# Patient Record
Sex: FEMALE | Race: OTHER | URBAN - METROPOLITAN AREA
[De-identification: names, ages, dates, MRNs, and addresses within clinical notes are randomized per-mention and may not be internally consistent; named-entity substitution may affect disease eponyms.]

---

## 2019-12-06 ENCOUNTER — INPATIENT (INPATIENT)
Facility: HOSPITAL | Age: 54
LOS: 3 days | Discharge: PSYCHIATRIC OR SUBSTANCE USE FACILITY | DRG: 917 | End: 2019-12-10
Attending: INTERNAL MEDICINE | Admitting: INTERNAL MEDICINE
Payer: COMMERCIAL

## 2019-12-06 VITALS
DIASTOLIC BLOOD PRESSURE: 81 MMHG | SYSTOLIC BLOOD PRESSURE: 144 MMHG | TEMPERATURE: 98 F | WEIGHT: 190.04 LBS | OXYGEN SATURATION: 97 % | RESPIRATION RATE: 16 BRPM | HEART RATE: 76 BPM | HEIGHT: 65 IN

## 2019-12-06 LAB
ALBUMIN SERPL ELPH-MCNC: 3.6 G/DL — SIGNIFICANT CHANGE UP (ref 3.4–5)
ALBUMIN SERPL ELPH-MCNC: 4.3 G/DL — SIGNIFICANT CHANGE UP (ref 3.4–5)
ALP SERPL-CCNC: 155 U/L — HIGH (ref 40–120)
ALP SERPL-CCNC: 175 U/L — HIGH (ref 40–120)
ALT FLD-CCNC: 21 U/L — SIGNIFICANT CHANGE UP (ref 12–42)
ALT FLD-CCNC: 24 U/L — SIGNIFICANT CHANGE UP (ref 12–42)
ANION GAP SERPL CALC-SCNC: 12 MMOL/L — SIGNIFICANT CHANGE UP (ref 9–16)
ANION GAP SERPL CALC-SCNC: 7 MMOL/L — LOW (ref 9–16)
ANION GAP SERPL CALC-SCNC: 9 MMOL/L — SIGNIFICANT CHANGE UP (ref 9–16)
APPEARANCE UR: CLEAR — SIGNIFICANT CHANGE UP
AST SERPL-CCNC: 26 U/L — SIGNIFICANT CHANGE UP (ref 15–37)
AST SERPL-CCNC: 43 U/L — HIGH (ref 15–37)
BASOPHILS # BLD AUTO: 0.06 K/UL — SIGNIFICANT CHANGE UP (ref 0–0.2)
BASOPHILS NFR BLD AUTO: 0.4 % — SIGNIFICANT CHANGE UP (ref 0–2)
BILIRUB SERPL-MCNC: 0.1 MG/DL — LOW (ref 0.2–1.2)
BILIRUB SERPL-MCNC: 0.4 MG/DL — SIGNIFICANT CHANGE UP (ref 0.2–1.2)
BILIRUB UR-MCNC: NEGATIVE — SIGNIFICANT CHANGE UP
BUN SERPL-MCNC: 11 MG/DL — SIGNIFICANT CHANGE UP (ref 7–23)
BUN SERPL-MCNC: 6 MG/DL — LOW (ref 7–23)
BUN SERPL-MCNC: 8 MG/DL — SIGNIFICANT CHANGE UP (ref 7–23)
CALCIUM SERPL-MCNC: 10.1 MG/DL — SIGNIFICANT CHANGE UP (ref 8.5–10.5)
CALCIUM SERPL-MCNC: 8.6 MG/DL — SIGNIFICANT CHANGE UP (ref 8.5–10.5)
CALCIUM SERPL-MCNC: 9 MG/DL — SIGNIFICANT CHANGE UP (ref 8.5–10.5)
CHLORIDE SERPL-SCNC: 100 MMOL/L — SIGNIFICANT CHANGE UP (ref 96–108)
CHLORIDE SERPL-SCNC: 106 MMOL/L — SIGNIFICANT CHANGE UP (ref 96–108)
CHLORIDE SERPL-SCNC: 89 MMOL/L — LOW (ref 96–108)
CO2 SERPL-SCNC: 21 MMOL/L — LOW (ref 22–31)
CO2 SERPL-SCNC: 22 MMOL/L — SIGNIFICANT CHANGE UP (ref 22–31)
CO2 SERPL-SCNC: 23 MMOL/L — SIGNIFICANT CHANGE UP (ref 22–31)
COLOR SPEC: YELLOW — SIGNIFICANT CHANGE UP
CREAT SERPL-MCNC: 0.91 MG/DL — SIGNIFICANT CHANGE UP (ref 0.5–1.3)
CREAT SERPL-MCNC: 0.99 MG/DL — SIGNIFICANT CHANGE UP (ref 0.5–1.3)
CREAT SERPL-MCNC: 1.33 MG/DL — HIGH (ref 0.5–1.3)
DIFF PNL FLD: ABNORMAL
EOSINOPHIL # BLD AUTO: 0.22 K/UL — SIGNIFICANT CHANGE UP (ref 0–0.5)
EOSINOPHIL NFR BLD AUTO: 1.6 % — SIGNIFICANT CHANGE UP (ref 0–6)
FLU A RESULT: SIGNIFICANT CHANGE UP
FLU A RESULT: SIGNIFICANT CHANGE UP
FLUAV AG NPH QL: SIGNIFICANT CHANGE UP
FLUBV AG NPH QL: SIGNIFICANT CHANGE UP
GLUCOSE SERPL-MCNC: 147 MG/DL — HIGH (ref 70–99)
GLUCOSE SERPL-MCNC: 276 MG/DL — HIGH (ref 70–99)
GLUCOSE SERPL-MCNC: 289 MG/DL — HIGH (ref 70–99)
GLUCOSE UR QL: 250
HCG UR QL: NEGATIVE — SIGNIFICANT CHANGE UP
HCT VFR BLD CALC: 39.9 % — SIGNIFICANT CHANGE UP (ref 34.5–45)
HGB BLD-MCNC: 13.1 G/DL — SIGNIFICANT CHANGE UP (ref 11.5–15.5)
IMM GRANULOCYTES NFR BLD AUTO: 0.7 % — SIGNIFICANT CHANGE UP (ref 0–1.5)
KETONES UR-MCNC: ABNORMAL MG/DL
LEUKOCYTE ESTERASE UR-ACNC: ABNORMAL
LITHIUM SERPL-MCNC: 3.27 MMOL/L — CRITICAL HIGH (ref 0.6–1.2)
LYMPHOCYTES # BLD AUTO: 1.75 K/UL — SIGNIFICANT CHANGE UP (ref 1–3.3)
LYMPHOCYTES # BLD AUTO: 12.8 % — LOW (ref 13–44)
MAGNESIUM SERPL-MCNC: 1.5 MG/DL — LOW (ref 1.6–2.6)
MAGNESIUM SERPL-MCNC: 1.9 MG/DL — SIGNIFICANT CHANGE UP (ref 1.6–2.6)
MCHC RBC-ENTMCNC: 28.4 PG — SIGNIFICANT CHANGE UP (ref 27–34)
MCHC RBC-ENTMCNC: 32.8 GM/DL — SIGNIFICANT CHANGE UP (ref 32–36)
MCV RBC AUTO: 86.6 FL — SIGNIFICANT CHANGE UP (ref 80–100)
MONOCYTES # BLD AUTO: 0.67 K/UL — SIGNIFICANT CHANGE UP (ref 0–0.9)
MONOCYTES NFR BLD AUTO: 4.9 % — SIGNIFICANT CHANGE UP (ref 2–14)
NEUTROPHILS # BLD AUTO: 10.89 K/UL — HIGH (ref 1.8–7.4)
NEUTROPHILS NFR BLD AUTO: 79.6 % — HIGH (ref 43–77)
NITRITE UR-MCNC: NEGATIVE — SIGNIFICANT CHANGE UP
NRBC # BLD: 0 /100 WBCS — SIGNIFICANT CHANGE UP (ref 0–0)
PCO2 BLDA: 51 MMHG — HIGH (ref 32–45)
PH BLDA: 7.26 — LOW (ref 7.35–7.45)
PH UR: 6.5 — SIGNIFICANT CHANGE UP (ref 5–8)
PLATELET # BLD AUTO: 382 K/UL — SIGNIFICANT CHANGE UP (ref 150–400)
PO2 BLDA: 28 MMHG — CRITICAL LOW (ref 83–108)
POTASSIUM SERPL-MCNC: 3.2 MMOL/L — LOW (ref 3.5–5.3)
POTASSIUM SERPL-MCNC: 4 MMOL/L — SIGNIFICANT CHANGE UP (ref 3.5–5.3)
POTASSIUM SERPL-MCNC: 4.1 MMOL/L — SIGNIFICANT CHANGE UP (ref 3.5–5.3)
POTASSIUM SERPL-SCNC: 3.2 MMOL/L — LOW (ref 3.5–5.3)
POTASSIUM SERPL-SCNC: 4 MMOL/L — SIGNIFICANT CHANGE UP (ref 3.5–5.3)
POTASSIUM SERPL-SCNC: 4.1 MMOL/L — SIGNIFICANT CHANGE UP (ref 3.5–5.3)
PROT SERPL-MCNC: 6.9 G/DL — SIGNIFICANT CHANGE UP (ref 6.4–8.2)
PROT SERPL-MCNC: 8.3 G/DL — HIGH (ref 6.4–8.2)
PROT UR-MCNC: 30 MG/DL
RBC # BLD: 4.61 M/UL — SIGNIFICANT CHANGE UP (ref 3.8–5.2)
RBC # FLD: 13.2 % — SIGNIFICANT CHANGE UP (ref 10.3–14.5)
RSV RESULT: SIGNIFICANT CHANGE UP
RSV RNA RESP QL NAA+PROBE: SIGNIFICANT CHANGE UP
SAO2 % BLDA: 40 % — LOW (ref 95–100)
SODIUM SERPL-SCNC: 124 MMOL/L — LOW (ref 132–145)
SODIUM SERPL-SCNC: 131 MMOL/L — LOW (ref 132–145)
SODIUM SERPL-SCNC: 134 MMOL/L — SIGNIFICANT CHANGE UP (ref 132–145)
SP GR SPEC: <=1.005 — SIGNIFICANT CHANGE UP (ref 1–1.03)
UROBILINOGEN FLD QL: 0.2 E.U./DL — SIGNIFICANT CHANGE UP
WBC # BLD: 13.68 K/UL — HIGH (ref 3.8–10.5)
WBC # FLD AUTO: 13.68 K/UL — HIGH (ref 3.8–10.5)

## 2019-12-06 PROCEDURE — 99291 CRITICAL CARE FIRST HOUR: CPT

## 2019-12-06 PROCEDURE — 99292 CRITICAL CARE ADDL 30 MIN: CPT

## 2019-12-06 PROCEDURE — 71045 X-RAY EXAM CHEST 1 VIEW: CPT | Mod: 26

## 2019-12-06 PROCEDURE — 70450 CT HEAD/BRAIN W/O DYE: CPT | Mod: 26

## 2019-12-06 RX ORDER — SODIUM CHLORIDE 9 MG/ML
2000 INJECTION INTRAMUSCULAR; INTRAVENOUS; SUBCUTANEOUS ONCE
Refills: 0 | Status: COMPLETED | OUTPATIENT
Start: 2019-12-06 | End: 2019-12-06

## 2019-12-06 RX ORDER — MIDAZOLAM HYDROCHLORIDE 1 MG/ML
2 INJECTION, SOLUTION INTRAMUSCULAR; INTRAVENOUS ONCE
Refills: 0 | Status: DISCONTINUED | OUTPATIENT
Start: 2019-12-06 | End: 2019-12-06

## 2019-12-06 RX ORDER — ARIPIPRAZOLE 15 MG/1
30 TABLET ORAL ONCE
Refills: 0 | Status: COMPLETED | OUTPATIENT
Start: 2019-12-06 | End: 2019-12-06

## 2019-12-06 RX ORDER — PHENAZOPYRIDINE HCL 100 MG
200 TABLET ORAL ONCE
Refills: 0 | Status: COMPLETED | OUTPATIENT
Start: 2019-12-06 | End: 2019-12-06

## 2019-12-06 RX ORDER — HALOPERIDOL DECANOATE 100 MG/ML
5 INJECTION INTRAMUSCULAR ONCE
Refills: 0 | Status: COMPLETED | OUTPATIENT
Start: 2019-12-06 | End: 2019-12-06

## 2019-12-06 RX ORDER — KETAMINE HYDROCHLORIDE 100 MG/ML
10 INJECTION INTRAMUSCULAR; INTRAVENOUS ONCE
Refills: 0 | Status: DISCONTINUED | OUTPATIENT
Start: 2019-12-06 | End: 2019-12-06

## 2019-12-06 RX ORDER — DIPHENHYDRAMINE HCL 50 MG
50 CAPSULE ORAL ONCE
Refills: 0 | Status: COMPLETED | OUTPATIENT
Start: 2019-12-06 | End: 2019-12-06

## 2019-12-06 RX ORDER — SODIUM CHLORIDE 9 MG/ML
1000 INJECTION INTRAMUSCULAR; INTRAVENOUS; SUBCUTANEOUS ONCE
Refills: 0 | Status: COMPLETED | OUTPATIENT
Start: 2019-12-06 | End: 2019-12-06

## 2019-12-06 RX ORDER — CEFTRIAXONE 500 MG/1
1000 INJECTION, POWDER, FOR SOLUTION INTRAMUSCULAR; INTRAVENOUS ONCE
Refills: 0 | Status: COMPLETED | OUTPATIENT
Start: 2019-12-06 | End: 2019-12-06

## 2019-12-06 RX ORDER — MAGNESIUM SULFATE 500 MG/ML
2 VIAL (ML) INJECTION ONCE
Refills: 0 | Status: COMPLETED | OUTPATIENT
Start: 2019-12-06 | End: 2019-12-06

## 2019-12-06 RX ORDER — SODIUM CHLORIDE 9 MG/ML
1000 INJECTION INTRAMUSCULAR; INTRAVENOUS; SUBCUTANEOUS
Refills: 0 | Status: DISCONTINUED | OUTPATIENT
Start: 2019-12-07 | End: 2019-12-07

## 2019-12-06 RX ORDER — POTASSIUM CHLORIDE 20 MEQ
40 PACKET (EA) ORAL ONCE
Refills: 0 | Status: COMPLETED | OUTPATIENT
Start: 2019-12-06 | End: 2019-12-06

## 2019-12-06 RX ORDER — METFORMIN HYDROCHLORIDE 850 MG/1
1000 TABLET ORAL ONCE
Refills: 0 | Status: COMPLETED | OUTPATIENT
Start: 2019-12-06 | End: 2019-12-06

## 2019-12-06 RX ORDER — ACETAMINOPHEN 500 MG
975 TABLET ORAL ONCE
Refills: 0 | Status: COMPLETED | OUTPATIENT
Start: 2019-12-06 | End: 2019-12-06

## 2019-12-06 RX ORDER — AZITHROMYCIN 500 MG/1
500 TABLET, FILM COATED ORAL ONCE
Refills: 0 | Status: COMPLETED | OUTPATIENT
Start: 2019-12-06 | End: 2019-12-06

## 2019-12-06 RX ADMIN — ARIPIPRAZOLE 30 MILLIGRAM(S): 15 TABLET ORAL at 16:10

## 2019-12-06 RX ADMIN — Medication 50 MILLIGRAM(S): at 12:24

## 2019-12-06 RX ADMIN — METFORMIN HYDROCHLORIDE 1000 MILLIGRAM(S): 850 TABLET ORAL at 16:13

## 2019-12-06 RX ADMIN — KETAMINE HYDROCHLORIDE 10 MILLIGRAM(S): 100 INJECTION INTRAMUSCULAR; INTRAVENOUS at 14:46

## 2019-12-06 RX ADMIN — AZITHROMYCIN 255 MILLIGRAM(S): 500 TABLET, FILM COATED ORAL at 16:17

## 2019-12-06 RX ADMIN — KETAMINE HYDROCHLORIDE 10 MILLIGRAM(S): 100 INJECTION INTRAMUSCULAR; INTRAVENOUS at 20:53

## 2019-12-06 RX ADMIN — Medication 50 GRAM(S): at 11:07

## 2019-12-06 RX ADMIN — Medication 50 MILLIGRAM(S): at 17:29

## 2019-12-06 RX ADMIN — Medication 1 MILLIGRAM(S): at 10:27

## 2019-12-06 RX ADMIN — Medication 1 MILLIGRAM(S): at 11:48

## 2019-12-06 RX ADMIN — Medication 40 MILLIEQUIVALENT(S): at 11:07

## 2019-12-06 RX ADMIN — KETAMINE HYDROCHLORIDE 10 MILLIGRAM(S): 100 INJECTION INTRAMUSCULAR; INTRAVENOUS at 17:38

## 2019-12-06 RX ADMIN — KETAMINE HYDROCHLORIDE 10 MILLIGRAM(S): 100 INJECTION INTRAMUSCULAR; INTRAVENOUS at 13:23

## 2019-12-06 RX ADMIN — SODIUM CHLORIDE 1000 MILLILITER(S): 9 INJECTION INTRAMUSCULAR; INTRAVENOUS; SUBCUTANEOUS at 10:27

## 2019-12-06 RX ADMIN — Medication 2 MILLIGRAM(S): at 14:46

## 2019-12-06 RX ADMIN — SODIUM CHLORIDE 2000 MILLILITER(S): 9 INJECTION INTRAMUSCULAR; INTRAVENOUS; SUBCUTANEOUS at 18:38

## 2019-12-06 RX ADMIN — Medication 975 MILLIGRAM(S): at 11:07

## 2019-12-06 RX ADMIN — Medication 200 MILLIGRAM(S): at 12:23

## 2019-12-06 RX ADMIN — KETAMINE HYDROCHLORIDE 10 MILLIGRAM(S): 100 INJECTION INTRAMUSCULAR; INTRAVENOUS at 14:11

## 2019-12-06 RX ADMIN — HALOPERIDOL DECANOATE 5 MILLIGRAM(S): 100 INJECTION INTRAMUSCULAR at 15:54

## 2019-12-06 RX ADMIN — SODIUM CHLORIDE 1000 MILLILITER(S): 9 INJECTION INTRAMUSCULAR; INTRAVENOUS; SUBCUTANEOUS at 11:09

## 2019-12-06 RX ADMIN — CEFTRIAXONE 100 MILLIGRAM(S): 500 INJECTION, POWDER, FOR SOLUTION INTRAMUSCULAR; INTRAVENOUS at 11:07

## 2019-12-06 RX ADMIN — Medication 2 MILLIGRAM(S): at 12:23

## 2019-12-06 RX ADMIN — KETAMINE HYDROCHLORIDE 10 MILLIGRAM(S): 100 INJECTION INTRAMUSCULAR; INTRAVENOUS at 22:46

## 2019-12-06 RX ADMIN — HALOPERIDOL DECANOATE 5 MILLIGRAM(S): 100 INJECTION INTRAMUSCULAR at 13:16

## 2019-12-06 NOTE — ED PROVIDER NOTE - CLINICAL SUMMARY MEDICAL DECISION MAKING FREE TEXT BOX
53 y/o F with hx of bipolar disorder, asthma, type II diabetes on insulin and metformin, HLD, and skin rash sent in from mental [facility] for bizarre shaking behavior. Pt very difficult to interview secondary to intermittent agitation and very low intellectual functioning. Patient does not indicate specific pain but on exam patient is agitated and difficult to redirect. Patient not cooperating, trying to leave ED AMA.... 53 y/o F with hx of bipolar disorder, asthma, type II diabetes on insulin and metformin, HLD, and skin rash sent in from mental health shelter for bizarre shaking behavior. Pt very difficult to interview secondary to intermittent agitation and very low intellectual functioning and AMS/apparent delirium. Patient does not indicate specific pain but on exam patient is agitated and difficult to redirect. Patient not cooperating, trying to elope. Initial eval suggestive of UTI, possible PNA. Getting IV sedation- very difficult to sedate. Placed on obs. A. Will give IV sedation.

## 2019-12-06 NOTE — ED PROVIDER NOTE - OBJECTIVE STATEMENT
53 y/o F with hx of bipolar disorder type II diabetes on insulin and metformin, HLD, and skin rash sent in from mental health shelter for bizarre shaking behavior. Pt very difficult to interview secondary to intermittent agitation and very low intellectual functioning. Patient endorses almost everything positive on ROS (+ sore throat, +urinary discomfort) and says that she is uncontrollably shaking and does not know why. At times says she is anxious. Limited history secondary to severity of mental illness. 53 y/o F with hx of bipolar disorder type II, asthma, diabetes on insulin and metformin, HLD, and skin rash sent in from mental health shelter for bizarre shaking behavior. Pt very difficult to interview secondary to intermittent agitation and very low intellectual functioning. Patient endorses almost everything positive on ROS (+ sore throat, +urinary discomfort) and says that she is uncontrollably shaking and does not know why. At times says she is anxious. Limited history secondary to severity of mental illness. 53 y/o F with hx of bipolar disorder type II, asthma, diabetes on insulin and metformin, HLD, and skin rash sent in from mental health shelter for bizarre shaking behavior. Pt very difficult to interview secondary to intermittent agitation and apparent very low intellectual functioning and AMS. Patient endorses almost everything positive on ROS (+ sore throat, +urinary discomfort) and says that she is uncontrollably shaking and does not know why. At times says she is anxious. Limited history secondary to severity of mental illness and AMS.

## 2019-12-06 NOTE — ED CDU PROVIDER INITIAL DAY NOTE - DETAILS
Patient placed on obs fo AMS, delirium and UTI/poss PNA. Will need admission. Very difficult to sedate.

## 2019-12-06 NOTE — ED ADULT NURSE NOTE - CHIEF COMPLAINT QUOTE
pt ran from [facility] [address] ; called 911 to say, "I'm having a seizure" Pt is ambulate with steady gait, and states she is presently having a seizure in triage. Able to answer questions, anxious. Hx Dm, asthma

## 2019-12-06 NOTE — ED PROVIDER NOTE - PMH
Bipolar 1 disorder    HLD (hyperlipidemia)    Skin rash    Type II diabetes mellitus Asthma    Bipolar 1 disorder    HLD (hyperlipidemia)    Mental health disorder    Skin rash    Type II diabetes mellitus

## 2019-12-06 NOTE — ED PROVIDER NOTE - PROGRESS NOTE DETAILS
Agitated and attempting to leave ED. Not cooperating. Need to treat UTI with IV antibiotics and give fluids to stabilize electrolyte abnormalities. Agitated and attempting to leave ED. Not cooperating. Need to treat UTI with IV antibiotics and give fluids to stabilize electrolyte abnormalities. Will restrain patient and put her on a 1 to 1. Will give Haldol. Giving IV ketamine due to no change in agitation. In total received 5 Haldol IM, 4 IV ativan, 50 IV Benadryl. unclear whether this is close to baseline psych or whether hyponatremia and UTI is worsening her mental status. Patient still not cooperating. Remains awake and agitated s/p Haldol. Will give 2mg ativan IV. Additional 5mg of Haldol. Head CT negative pending admission to [facility]. Trying to titrate medications so she will be safe for transport.

## 2019-12-06 NOTE — ED CDU PROVIDER INITIAL DAY NOTE - OBJECTIVE STATEMENT
55 y/o F with hx of bipolar disorder type II, asthma, diabetes on insulin and metformin, HLD, and skin rash sent in from mental health shelter for bizarre shaking behavior. Pt very difficult to interview secondary to intermittent agitation and apparent very low intellectual functioning and AMS. Patient endorses almost everything positive on ROS (+ sore throat, +urinary discomfort) and says that she is uncontrollably shaking and does not know why. At times says she is anxious. Limited history secondary to severity of mental illness and AMS.

## 2019-12-06 NOTE — ED ADULT NURSE NOTE - OBJECTIVE STATEMENT
Pt present to the ER voluntarily shaking. States that she is having a seizure while shaking. Unreliable historian. Says yes to every question asked.

## 2019-12-06 NOTE — ED ADULT NURSE REASSESSMENT NOTE - NS ED NURSE REASSESS COMMENT FT1
Pt bed assignment changed. Report given to CARA Donovan. Awaiting transportation. Pt on continuos one to one.
Pt received from previous RN. Pt in continuos restraints no signs of skin break down. Pt on continuos one to one. Repeat blood work obtained. Pending transportation to [facility].
Pt still in 4 point restraints. Pt is almost asleep but easily arousable. Kept in restraints for own safety. Awaiting ICU bed. [place] level back as above normal.
Pt transferred to [facility]. Pt in 4 point restraints as per MD order. Pt has two IV lines. Pt awake and alert. Vitals as per flow sheet.
Pt trying to elope. Trying to be redirected to room. Pt is confused and is not capable of leaving the ER safely alone. Put in 4 point restraints and medicated as per order. Put on 1;1. Belongings taken, bagged and labelled. Security at bedside. To monitor pt while in restraints.
ketamine premixed by pharmacy unable to be scan
pt still screaming and agitated, confused. Requesting for her daily meds- given as ordered. Pt on continuous 1;1 observation. Fall/elopement prec initiated and maintained.
assumed care of pt, pt is in CT at this time and tech is calling to request additional medication for sedation. Pt is sitting up in bed and not able to relax for ct. Pt has previously been in leather restraints because she is confused and combative to staff and endangering herself.

## 2019-12-06 NOTE — ED PROVIDER NOTE - EYES, MLM
Clear bilaterally, pupils equal, round and reactive to light. Clear bilaterally, pupils equal, round 4mm, no nystagmus

## 2019-12-06 NOTE — ED CDU PROVIDER INITIAL DAY NOTE - MEDICAL DECISION MAKING DETAILS
Patient placed on obs fo AMS, delirium and UTI/poss PNA. Was very difficult to sedate. In total got Ativan 6mg IV, Benadryl 100mg IV, Haldol 10 mg IM, Aripiprazole 30mg PO and ketamine 10mg mg x 5 doses (holds her for about 30 mins). Li level 3.27. Got CTX and Azithro. Accepted for SDU admit at [facility] by Dr. Mao.

## 2019-12-06 NOTE — ED CDU PROVIDER INITIAL DAY NOTE - PMH
Asthma    Bipolar 1 disorder    HLD (hyperlipidemia)    Mental health disorder    Skin rash    Type II diabetes mellitus

## 2019-12-06 NOTE — ED ADULT TRIAGE NOTE - CHIEF COMPLAINT QUOTE
pt ran from [facility] [address] ; called 911 to say, "I'm having a seizure" Pt is ambulate with steady gait, and states she is presently having a seizure in triage. Able to answer questions, anxious. Hx Dm, asthma

## 2019-12-06 NOTE — ED CDU PROVIDER INITIAL DAY NOTE - PROGRESS NOTE DETAILS
Agitated and attempting to leave ED. Not cooperating. Need to treat UTI with IV antibiotics and give fluids to stabilize electrolyte abnormalities. Will restrain patient and put her on a 1 to 1. Will give Haldol. Giving IV ketamine due to no change in agitation. In total received 5 Haldol IM, 4 IV ativan, 50 IV Benadryl. unclear whether this is close to baseline psych or whether hyponatremia and UTI is worsening her mental status. Patient still not cooperating. Remains awake and agitated s/p Haldol. Will give 2mg ativan IV. Additional 5mg of Haldol. Head CT negative pending admission to [facility]. Trying to titrate medications so she will be safe for transport. Finally sleeping x 1h. Accepted to [facility] SDU.  Li toxicity mixed with UTI and poss PNA.  I left a message for RN at the [facility] where she is a new resident. [phone] Spoke with ANKITA Tox, they agree with supportive care and diuresis. No HD. Trend levels, call [facility] to ask about med changes tomorrow. Tox will follow along. Spoke with ANKITA Tox, Dr. Gonzalez. They agree with supportive care and diuresis. No HD. Trend levels, call [facility] to ask about med changes tomorrow. Tox will follow along. Dr. Moore upgraded patient to MICU as a bed opened up. ABG attempted- patient struggling, needed manual hold. Was likely a VGB. 7.26/51. No AG and normal HCO3 on last set of labs. Repeat BMP pending. Tox svc called back, they want a repeat Li level when she gets to [facility], if trending down cont IVF, if not dropping consider HD. Patient still not able to comprehend dx but is more awake. No longer yelling constantly.

## 2019-12-06 NOTE — ED PROVIDER NOTE - DIAGNOSTIC INTERPRETATION
Interpreted by ED physician  Chest x-ray, 1 views  Lungs clear, heart shadow normal, bony structures normal, no free air under diaphragm, no PTX Interpreted by ED physician  Chest x-ray, 1 view  Lungs clear, heart shadow normal, bony structures normal, no free air under diaphragm, no PTX

## 2019-12-06 NOTE — ED PROVIDER NOTE - MUSCULOSKELETAL, MLM
Spine appears normal, range of motion is not limited, no muscle or joint tenderness. Walked on arrival then was unable to walk unassisted.

## 2019-12-06 NOTE — ED PROVIDER NOTE - SKIN, MLM
Scaling erythematous circumscribed lesions over abdomen and back. ? psoriasis or eczema, chronic appearing.

## 2019-12-06 NOTE — ED CDU PROVIDER DISPOSITION NOTE - CLINICAL COURSE
Patient placed on obs fo AMS, delirium and UTI/poss PNA. Was very difficult to sedate. In total got Ativan 6mg IV, Benadryl 100mg IV, Haldol 10 mg IM, Aripiprazole 30mg PO and ketamine 10mg mg x 5 doses (holds her for about 30 mins). Li level 3.27. Got CTX and Azithro. Accepted for SDU admit at [facility] by Dr. Mao.    Na coming up with IVF, getting more saline for Li level that just came back.   [facility] ([facility] of [facility]) is where she stays. [phone]

## 2019-12-07 LAB
ALBUMIN SERPL ELPH-MCNC: 3.5 G/DL — SIGNIFICANT CHANGE UP (ref 3.3–5)
ALBUMIN SERPL ELPH-MCNC: 3.5 G/DL — SIGNIFICANT CHANGE UP (ref 3.3–5)
ALP SERPL-CCNC: 135 U/L — HIGH (ref 40–120)
ALP SERPL-CCNC: 151 U/L — HIGH (ref 40–120)
ALT FLD-CCNC: 15 U/L — SIGNIFICANT CHANGE UP (ref 10–45)
ALT FLD-CCNC: 16 U/L — SIGNIFICANT CHANGE UP (ref 10–45)
ANION GAP SERPL CALC-SCNC: 10 MMOL/L — SIGNIFICANT CHANGE UP (ref 5–17)
ANION GAP SERPL CALC-SCNC: 7 MMOL/L — SIGNIFICANT CHANGE UP (ref 5–17)
ANION GAP SERPL CALC-SCNC: 7 MMOL/L — SIGNIFICANT CHANGE UP (ref 5–17)
APTT BLD: 27.4 SEC — LOW (ref 27.5–36.3)
AST SERPL-CCNC: 29 U/L — SIGNIFICANT CHANGE UP (ref 10–40)
AST SERPL-CCNC: 30 U/L — SIGNIFICANT CHANGE UP (ref 10–40)
BASOPHILS # BLD AUTO: 0.04 K/UL — SIGNIFICANT CHANGE UP (ref 0–0.2)
BASOPHILS NFR BLD AUTO: 0.4 % — SIGNIFICANT CHANGE UP (ref 0–2)
BILIRUB SERPL-MCNC: 0.2 MG/DL — SIGNIFICANT CHANGE UP (ref 0.2–1.2)
BILIRUB SERPL-MCNC: 0.4 MG/DL — SIGNIFICANT CHANGE UP (ref 0.2–1.2)
BUN SERPL-MCNC: 3 MG/DL — LOW (ref 7–23)
BUN SERPL-MCNC: 4 MG/DL — LOW (ref 7–23)
BUN SERPL-MCNC: 5 MG/DL — LOW (ref 7–23)
CALCIUM SERPL-MCNC: 8.5 MG/DL — SIGNIFICANT CHANGE UP (ref 8.4–10.5)
CALCIUM SERPL-MCNC: 8.6 MG/DL — SIGNIFICANT CHANGE UP (ref 8.4–10.5)
CALCIUM SERPL-MCNC: 9 MG/DL — SIGNIFICANT CHANGE UP (ref 8.4–10.5)
CHLORIDE SERPL-SCNC: 110 MMOL/L — HIGH (ref 96–108)
CHLORIDE SERPL-SCNC: 111 MMOL/L — HIGH (ref 96–108)
CHLORIDE SERPL-SCNC: 113 MMOL/L — HIGH (ref 96–108)
CHLORIDE UR-SCNC: 55 MMOL/L — SIGNIFICANT CHANGE UP
CO2 SERPL-SCNC: 19 MMOL/L — LOW (ref 22–31)
CO2 SERPL-SCNC: 20 MMOL/L — LOW (ref 22–31)
CO2 SERPL-SCNC: 20 MMOL/L — LOW (ref 22–31)
CREAT ?TM UR-MCNC: 18 MG/DL — SIGNIFICANT CHANGE UP
CREAT SERPL-MCNC: 0.65 MG/DL — SIGNIFICANT CHANGE UP (ref 0.5–1.3)
CREAT SERPL-MCNC: 0.66 MG/DL — SIGNIFICANT CHANGE UP (ref 0.5–1.3)
CREAT SERPL-MCNC: 0.68 MG/DL — SIGNIFICANT CHANGE UP (ref 0.5–1.3)
CULTURE RESULTS: SIGNIFICANT CHANGE UP
EOSINOPHIL # BLD AUTO: 0.17 K/UL — SIGNIFICANT CHANGE UP (ref 0–0.5)
EOSINOPHIL NFR BLD AUTO: 1.6 % — SIGNIFICANT CHANGE UP (ref 0–6)
ETHANOL SERPL-MCNC: <10 MG/DL — SIGNIFICANT CHANGE UP (ref 0–10)
GLUCOSE BLDC GLUCOMTR-MCNC: 151 MG/DL — HIGH (ref 70–99)
GLUCOSE BLDC GLUCOMTR-MCNC: 190 MG/DL — HIGH (ref 70–99)
GLUCOSE BLDC GLUCOMTR-MCNC: 258 MG/DL — HIGH (ref 70–99)
GLUCOSE BLDC GLUCOMTR-MCNC: 337 MG/DL — HIGH (ref 70–99)
GLUCOSE BLDC GLUCOMTR-MCNC: 343 MG/DL — HIGH (ref 70–99)
GLUCOSE BLDC GLUCOMTR-MCNC: 76 MG/DL — SIGNIFICANT CHANGE UP (ref 70–99)
GLUCOSE SERPL-MCNC: 250 MG/DL — HIGH (ref 70–99)
GLUCOSE SERPL-MCNC: 300 MG/DL — HIGH (ref 70–99)
GLUCOSE SERPL-MCNC: 323 MG/DL — HIGH (ref 70–99)
HBA1C BLD-MCNC: 9.4 % — HIGH (ref 4–5.6)
HCT VFR BLD CALC: 35.4 % — SIGNIFICANT CHANGE UP (ref 34.5–45)
HCV AB S/CO SERPL IA: 0.29 S/CO — SIGNIFICANT CHANGE UP
HCV AB SERPL-IMP: SIGNIFICANT CHANGE UP
HGB BLD-MCNC: 11 G/DL — LOW (ref 11.5–15.5)
IMM GRANULOCYTES NFR BLD AUTO: 0.5 % — SIGNIFICANT CHANGE UP (ref 0–1.5)
INR BLD: 1.12 — SIGNIFICANT CHANGE UP (ref 0.88–1.16)
LEGIONELLA AG UR QL: NEGATIVE — SIGNIFICANT CHANGE UP
LITHIUM SERPL-MCNC: 0.97 MMOL/L — SIGNIFICANT CHANGE UP (ref 0.6–1.2)
LITHIUM SERPL-MCNC: 2.32 MMOL/L — CRITICAL HIGH (ref 0.6–1.2)
LYMPHOCYTES # BLD AUTO: 1.68 K/UL — SIGNIFICANT CHANGE UP (ref 1–3.3)
LYMPHOCYTES # BLD AUTO: 15.6 % — SIGNIFICANT CHANGE UP (ref 13–44)
MAGNESIUM SERPL-MCNC: 1.8 MG/DL — SIGNIFICANT CHANGE UP (ref 1.6–2.6)
MCHC RBC-ENTMCNC: 28.6 PG — SIGNIFICANT CHANGE UP (ref 27–34)
MCHC RBC-ENTMCNC: 31.1 GM/DL — LOW (ref 32–36)
MCV RBC AUTO: 91.9 FL — SIGNIFICANT CHANGE UP (ref 80–100)
MONOCYTES # BLD AUTO: 0.84 K/UL — SIGNIFICANT CHANGE UP (ref 0–0.9)
MONOCYTES NFR BLD AUTO: 7.8 % — SIGNIFICANT CHANGE UP (ref 2–14)
NEUTROPHILS # BLD AUTO: 7.97 K/UL — HIGH (ref 1.8–7.4)
NEUTROPHILS NFR BLD AUTO: 74.1 % — SIGNIFICANT CHANGE UP (ref 43–77)
NRBC # BLD: 0 /100 WBCS — SIGNIFICANT CHANGE UP (ref 0–0)
OSMOLALITY SERPL: 298 MOSMOL/KG — SIGNIFICANT CHANGE UP (ref 275–300)
PCP SPEC-MCNC: SIGNIFICANT CHANGE UP
PHOSPHATE SERPL-MCNC: 2.1 MG/DL — LOW (ref 2.5–4.5)
PLATELET # BLD AUTO: 289 K/UL — SIGNIFICANT CHANGE UP (ref 150–400)
POTASSIUM SERPL-MCNC: 3.8 MMOL/L — SIGNIFICANT CHANGE UP (ref 3.5–5.3)
POTASSIUM SERPL-MCNC: 4 MMOL/L — SIGNIFICANT CHANGE UP (ref 3.5–5.3)
POTASSIUM SERPL-MCNC: 4 MMOL/L — SIGNIFICANT CHANGE UP (ref 3.5–5.3)
POTASSIUM SERPL-SCNC: 3.8 MMOL/L — SIGNIFICANT CHANGE UP (ref 3.5–5.3)
POTASSIUM SERPL-SCNC: 4 MMOL/L — SIGNIFICANT CHANGE UP (ref 3.5–5.3)
POTASSIUM SERPL-SCNC: 4 MMOL/L — SIGNIFICANT CHANGE UP (ref 3.5–5.3)
POTASSIUM UR-SCNC: 5 MMOL/L — SIGNIFICANT CHANGE UP
PROT SERPL-MCNC: 5.8 G/DL — LOW (ref 6–8.3)
PROT SERPL-MCNC: 5.8 G/DL — LOW (ref 6–8.3)
PROTHROM AB SERPL-ACNC: 12.7 SEC — SIGNIFICANT CHANGE UP (ref 10–12.9)
RBC # BLD: 3.85 M/UL — SIGNIFICANT CHANGE UP (ref 3.8–5.2)
RBC # FLD: 13.6 % — SIGNIFICANT CHANGE UP (ref 10.3–14.5)
SALICYLATES SERPL-MCNC: <0.3 MG/DL — LOW (ref 2.8–20)
SODIUM SERPL-SCNC: 137 MMOL/L — SIGNIFICANT CHANGE UP (ref 135–145)
SODIUM SERPL-SCNC: 139 MMOL/L — SIGNIFICANT CHANGE UP (ref 135–145)
SODIUM SERPL-SCNC: 141 MMOL/L — SIGNIFICANT CHANGE UP (ref 135–145)
SODIUM UR-SCNC: 52 MMOL/L — SIGNIFICANT CHANGE UP
SPECIMEN SOURCE: SIGNIFICANT CHANGE UP
TSH SERPL-MCNC: 3.69 UIU/ML — SIGNIFICANT CHANGE UP (ref 0.35–4.94)
WBC # BLD: 10.75 K/UL — HIGH (ref 3.8–10.5)
WBC # FLD AUTO: 10.75 K/UL — HIGH (ref 3.8–10.5)

## 2019-12-07 PROCEDURE — 76770 US EXAM ABDO BACK WALL COMP: CPT | Mod: 26

## 2019-12-07 PROCEDURE — 99233 SBSQ HOSP IP/OBS HIGH 50: CPT | Mod: GC

## 2019-12-07 RX ORDER — CHLORHEXIDINE GLUCONATE 213 G/1000ML
1 SOLUTION TOPICAL
Refills: 0 | Status: DISCONTINUED | OUTPATIENT
Start: 2019-12-07 | End: 2019-12-08

## 2019-12-07 RX ORDER — ENOXAPARIN SODIUM 100 MG/ML
40 INJECTION SUBCUTANEOUS EVERY 24 HOURS
Refills: 0 | Status: DISCONTINUED | OUTPATIENT
Start: 2019-12-07 | End: 2019-12-10

## 2019-12-07 RX ORDER — SODIUM CHLORIDE 9 MG/ML
500 INJECTION, SOLUTION INTRAVENOUS ONCE
Refills: 0 | Status: COMPLETED | OUTPATIENT
Start: 2019-12-07 | End: 2019-12-07

## 2019-12-07 RX ORDER — CEFTRIAXONE 500 MG/1
2000 INJECTION, POWDER, FOR SOLUTION INTRAMUSCULAR; INTRAVENOUS EVERY 24 HOURS
Refills: 0 | Status: DISCONTINUED | OUTPATIENT
Start: 2019-12-07 | End: 2019-12-07

## 2019-12-07 RX ORDER — HALOPERIDOL DECANOATE 100 MG/ML
5 INJECTION INTRAMUSCULAR EVERY 6 HOURS
Refills: 0 | Status: DISCONTINUED | OUTPATIENT
Start: 2019-12-07 | End: 2019-12-10

## 2019-12-07 RX ORDER — HALOPERIDOL DECANOATE 100 MG/ML
2 INJECTION INTRAMUSCULAR ONCE
Refills: 0 | Status: COMPLETED | OUTPATIENT
Start: 2019-12-07 | End: 2019-12-07

## 2019-12-07 RX ORDER — DEXTROSE 50 % IN WATER 50 %
12.5 SYRINGE (ML) INTRAVENOUS ONCE
Refills: 0 | Status: DISCONTINUED | OUTPATIENT
Start: 2019-12-07 | End: 2019-12-10

## 2019-12-07 RX ORDER — SODIUM CHLORIDE 9 MG/ML
1000 INJECTION INTRAMUSCULAR; INTRAVENOUS; SUBCUTANEOUS ONCE
Refills: 0 | Status: DISCONTINUED | OUTPATIENT
Start: 2019-12-07 | End: 2019-12-07

## 2019-12-07 RX ORDER — DEXTROSE 10 % IN WATER 10 %
1000 INTRAVENOUS SOLUTION INTRAVENOUS
Refills: 0 | Status: DISCONTINUED | OUTPATIENT
Start: 2019-12-07 | End: 2019-12-07

## 2019-12-07 RX ORDER — SODIUM CHLORIDE 9 MG/ML
1000 INJECTION, SOLUTION INTRAVENOUS
Refills: 0 | Status: DISCONTINUED | OUTPATIENT
Start: 2019-12-07 | End: 2019-12-07

## 2019-12-07 RX ORDER — SODIUM CHLORIDE 9 MG/ML
1000 INJECTION INTRAMUSCULAR; INTRAVENOUS; SUBCUTANEOUS ONCE
Refills: 0 | Status: COMPLETED | OUTPATIENT
Start: 2019-12-07 | End: 2019-12-07

## 2019-12-07 RX ORDER — FOLIC ACID 0.8 MG
1 TABLET ORAL DAILY
Refills: 0 | Status: DISCONTINUED | OUTPATIENT
Start: 2019-12-07 | End: 2019-12-10

## 2019-12-07 RX ORDER — INSULIN GLARGINE 100 [IU]/ML
15 INJECTION, SOLUTION SUBCUTANEOUS AT BEDTIME
Refills: 0 | Status: DISCONTINUED | OUTPATIENT
Start: 2019-12-07 | End: 2019-12-07

## 2019-12-07 RX ORDER — DEXTROSE 50 % IN WATER 50 %
15 SYRINGE (ML) INTRAVENOUS ONCE
Refills: 0 | Status: DISCONTINUED | OUTPATIENT
Start: 2019-12-07 | End: 2019-12-10

## 2019-12-07 RX ORDER — CLONAZEPAM 1 MG
2 TABLET ORAL EVERY 12 HOURS
Refills: 0 | Status: DISCONTINUED | OUTPATIENT
Start: 2019-12-07 | End: 2019-12-09

## 2019-12-07 RX ORDER — GLUCAGON INJECTION, SOLUTION 0.5 MG/.1ML
1 INJECTION, SOLUTION SUBCUTANEOUS ONCE
Refills: 0 | Status: DISCONTINUED | OUTPATIENT
Start: 2019-12-07 | End: 2019-12-10

## 2019-12-07 RX ORDER — CEFTRIAXONE 500 MG/1
1000 INJECTION, POWDER, FOR SOLUTION INTRAMUSCULAR; INTRAVENOUS EVERY 24 HOURS
Refills: 0 | Status: DISCONTINUED | OUTPATIENT
Start: 2019-12-07 | End: 2019-12-07

## 2019-12-07 RX ORDER — SODIUM CHLORIDE 9 MG/ML
1000 INJECTION, SOLUTION INTRAVENOUS
Refills: 0 | Status: DISCONTINUED | OUTPATIENT
Start: 2019-12-07 | End: 2019-12-10

## 2019-12-07 RX ORDER — HEPARIN SODIUM 5000 [USP'U]/ML
5000 INJECTION INTRAVENOUS; SUBCUTANEOUS EVERY 8 HOURS
Refills: 0 | Status: DISCONTINUED | OUTPATIENT
Start: 2019-12-07 | End: 2019-12-07

## 2019-12-07 RX ORDER — DEXMEDETOMIDINE HYDROCHLORIDE IN 0.9% SODIUM CHLORIDE 4 UG/ML
0.4 INJECTION INTRAVENOUS
Qty: 200 | Refills: 0 | Status: DISCONTINUED | OUTPATIENT
Start: 2019-12-07 | End: 2019-12-08

## 2019-12-07 RX ORDER — PREGABALIN 225 MG/1
1000 CAPSULE ORAL DAILY
Refills: 0 | Status: DISCONTINUED | OUTPATIENT
Start: 2019-12-07 | End: 2019-12-10

## 2019-12-07 RX ORDER — INSULIN LISPRO 100/ML
VIAL (ML) SUBCUTANEOUS
Refills: 0 | Status: DISCONTINUED | OUTPATIENT
Start: 2019-12-07 | End: 2019-12-10

## 2019-12-07 RX ORDER — SODIUM,POTASSIUM PHOSPHATES 278-250MG
1 POWDER IN PACKET (EA) ORAL ONCE
Refills: 0 | Status: DISCONTINUED | OUTPATIENT
Start: 2019-12-07 | End: 2019-12-07

## 2019-12-07 RX ORDER — HUMAN INSULIN 100 [IU]/ML
5 INJECTION, SUSPENSION SUBCUTANEOUS ONCE
Refills: 0 | Status: COMPLETED | OUTPATIENT
Start: 2019-12-07 | End: 2019-12-07

## 2019-12-07 RX ORDER — NOREPINEPHRINE BITARTRATE/D5W 8 MG/250ML
0.05 PLASTIC BAG, INJECTION (ML) INTRAVENOUS
Qty: 8 | Refills: 0 | Status: DISCONTINUED | OUTPATIENT
Start: 2019-12-07 | End: 2019-12-08

## 2019-12-07 RX ORDER — AZITHROMYCIN 500 MG/1
500 TABLET, FILM COATED ORAL EVERY 24 HOURS
Refills: 0 | Status: DISCONTINUED | OUTPATIENT
Start: 2019-12-07 | End: 2019-12-07

## 2019-12-07 RX ORDER — SIMVASTATIN 20 MG/1
20 TABLET, FILM COATED ORAL AT BEDTIME
Refills: 0 | Status: DISCONTINUED | OUTPATIENT
Start: 2019-12-07 | End: 2019-12-10

## 2019-12-07 RX ORDER — HALOPERIDOL DECANOATE 100 MG/ML
5 INJECTION INTRAMUSCULAR EVERY 6 HOURS
Refills: 0 | Status: DISCONTINUED | OUTPATIENT
Start: 2019-12-07 | End: 2019-12-07

## 2019-12-07 RX ORDER — QUETIAPINE FUMARATE 200 MG/1
25 TABLET, FILM COATED ORAL DAILY
Refills: 0 | Status: DISCONTINUED | OUTPATIENT
Start: 2019-12-07 | End: 2019-12-09

## 2019-12-07 RX ORDER — MAGNESIUM SULFATE 500 MG/ML
2 VIAL (ML) INJECTION ONCE
Refills: 0 | Status: COMPLETED | OUTPATIENT
Start: 2019-12-07 | End: 2019-12-07

## 2019-12-07 RX ORDER — DEXTROSE 50 % IN WATER 50 %
25 SYRINGE (ML) INTRAVENOUS ONCE
Refills: 0 | Status: DISCONTINUED | OUTPATIENT
Start: 2019-12-07 | End: 2019-12-10

## 2019-12-07 RX ORDER — INSULIN GLARGINE 100 [IU]/ML
10 INJECTION, SOLUTION SUBCUTANEOUS AT BEDTIME
Refills: 0 | Status: DISCONTINUED | OUTPATIENT
Start: 2019-12-07 | End: 2019-12-08

## 2019-12-07 RX ORDER — ASPIRIN/CALCIUM CARB/MAGNESIUM 324 MG
81 TABLET ORAL DAILY
Refills: 0 | Status: DISCONTINUED | OUTPATIENT
Start: 2019-12-07 | End: 2019-12-10

## 2019-12-07 RX ADMIN — SODIUM CHLORIDE 100 MILLILITER(S): 9 INJECTION, SOLUTION INTRAVENOUS at 03:30

## 2019-12-07 RX ADMIN — Medication 8: at 22:28

## 2019-12-07 RX ADMIN — Medication 975 MILLIGRAM(S): at 17:21

## 2019-12-07 RX ADMIN — SODIUM CHLORIDE 100 MILLILITER(S): 9 INJECTION INTRAMUSCULAR; INTRAVENOUS; SUBCUTANEOUS at 01:30

## 2019-12-07 RX ADMIN — DEXMEDETOMIDINE HYDROCHLORIDE IN 0.9% SODIUM CHLORIDE 8.62 MICROGRAM(S)/KG/HR: 4 INJECTION INTRAVENOUS at 19:48

## 2019-12-07 RX ADMIN — INSULIN GLARGINE 10 UNIT(S): 100 INJECTION, SOLUTION SUBCUTANEOUS at 22:27

## 2019-12-07 RX ADMIN — SODIUM CHLORIDE 2000 MILLILITER(S): 9 INJECTION INTRAMUSCULAR; INTRAVENOUS; SUBCUTANEOUS at 01:00

## 2019-12-07 RX ADMIN — HALOPERIDOL DECANOATE 2 MILLIGRAM(S): 100 INJECTION INTRAMUSCULAR at 07:16

## 2019-12-07 RX ADMIN — CHLORHEXIDINE GLUCONATE 1 APPLICATION(S): 213 SOLUTION TOPICAL at 06:20

## 2019-12-07 RX ADMIN — Medication 50 GRAM(S): at 07:16

## 2019-12-07 RX ADMIN — Medication 8.08 MICROGRAM(S)/KG/MIN: at 03:12

## 2019-12-07 RX ADMIN — CEFTRIAXONE 100 MILLIGRAM(S): 500 INJECTION, POWDER, FOR SOLUTION INTRAMUSCULAR; INTRAVENOUS at 12:06

## 2019-12-07 RX ADMIN — DEXMEDETOMIDINE HYDROCHLORIDE IN 0.9% SODIUM CHLORIDE 8.62 MICROGRAM(S)/KG/HR: 4 INJECTION INTRAVENOUS at 08:44

## 2019-12-07 RX ADMIN — Medication 6: at 06:43

## 2019-12-07 RX ADMIN — MIDAZOLAM HYDROCHLORIDE 2 MILLIGRAM(S): 1 INJECTION, SOLUTION INTRAMUSCULAR; INTRAVENOUS at 00:05

## 2019-12-07 RX ADMIN — AZITHROMYCIN 255 MILLIGRAM(S): 500 TABLET, FILM COATED ORAL at 12:07

## 2019-12-07 RX ADMIN — SODIUM CHLORIDE 500 MILLILITER(S): 9 INJECTION, SOLUTION INTRAVENOUS at 03:18

## 2019-12-07 RX ADMIN — QUETIAPINE FUMARATE 25 MILLIGRAM(S): 200 TABLET, FILM COATED ORAL at 23:40

## 2019-12-07 RX ADMIN — SIMVASTATIN 20 MILLIGRAM(S): 20 TABLET, FILM COATED ORAL at 22:20

## 2019-12-07 RX ADMIN — Medication 2 MILLIGRAM(S): at 08:44

## 2019-12-07 RX ADMIN — SODIUM CHLORIDE 1500 MILLILITER(S): 9 INJECTION, SOLUTION INTRAVENOUS at 12:06

## 2019-12-07 RX ADMIN — HALOPERIDOL DECANOATE 5 MILLIGRAM(S): 100 INJECTION INTRAMUSCULAR at 00:15

## 2019-12-07 RX ADMIN — Medication 2 MILLIGRAM(S): at 07:49

## 2019-12-07 RX ADMIN — ENOXAPARIN SODIUM 40 MILLIGRAM(S): 100 INJECTION SUBCUTANEOUS at 06:20

## 2019-12-07 RX ADMIN — HUMAN INSULIN 5 UNIT(S): 100 INJECTION, SUSPENSION SUBCUTANEOUS at 07:32

## 2019-12-07 RX ADMIN — Medication 8.08 MICROGRAM(S)/KG/MIN: at 02:30

## 2019-12-07 RX ADMIN — HALOPERIDOL DECANOATE 2 MILLIGRAM(S): 100 INJECTION INTRAMUSCULAR at 07:37

## 2019-12-07 RX ADMIN — Medication 2 MILLIGRAM(S): at 23:40

## 2019-12-07 NOTE — H&P ADULT - ASSESSMENT
Pt is a 55 y/o F with hx of bipolar disorder type II, asthma, diabetes on insulin and metformin, HLD presenting with AMS possibly 2/2 to lithium toxicity. Pt admitted to MICU for further work up and management.    Neuro/psych  #Toxic metabolic encephalopathy 2/2 to possible lithium toxicity  - Pt presented with confusion and agitation to [facility]. Found to have lithium level 3.27.   - Pt s/p 3 L NS, ketamine, ativan, haldol in [facility]   - Pt arrived to [facility] alert, awake.  - Poison control called - recommended fluids and monitoring.   - Lithium levels improved.    Renal   #Hyponatremia  - Pt initially w/ Na of 124. Overcorrected with 3 L NS to 134. Repeat here at 139  - Possible 2/2 to SIADH from chronic lithium use  - Due to overcorrection - started on D5W.     #AMBER  - Resovled  - Likely prerenal w/ Cr 1.33 in setting of improvement s/p fluids  - continue to monitor  - f/u renal U/S    ID  #Sepsis  - Pt w/ wbc of 13.68, Tachycardiac to 120s, LLL opacity on CXR cocerning for PNA.  - Pt initiated on ceftriaxone and azithromycin (12/7- )    CV  #Hemodynamics  - Pt became hypotensive to 60s/40s - pt was given 1 L NS w/o much response   - Pt started on levo gtt    #HTN  - holding anti-hypertensives in setting of sepsis    Endocrine  #DM  - ISS    Psych  #Bipolar type II  - pt reportedly on lithium.   - on home abilify,         F: D5W  E: replete prn  N: NPO  DVT prophylaxis: lovenox  Dispo: MICU Pt is a 53 y/o F with hx of bipolar disorder type II, asthma, diabetes on insulin and metformin, HLD presenting with AMS possibly 2/2 to lithium toxicity. Pt admitted to MICU for further work up and management.    Neuro/psych  #Toxic metabolic encephalopathy 2/2 to possible lithium toxicity  - Pt presented with confusion and agitation to [facility]. Found to have lithium level 3.27.   - Pt s/p 3 L NS, ketamine, ativan, haldol in [facility]   - Pt arrived to [facility] alert, awake, agitated. Pt given versed, haldol.  - Poison control called - recommended fluids and monitoring. Consider renal consult.  - Lithium level improved to 2.32    Renal   #Hyponatremia  - Pt initially w/ Na of 124. Overcorrected with 3 L NS to 134. Repeat here at 139  - Possible 2/2 to SIADH from chronic lithium use  - Due to overcorrection - started on D5W.     #AMBER  - Resolved  - Likely prerenal w/ Cr 1.33 in setting of improvement s/p fluids  - continue to monitor  - f/u renal U/S    ID  #Sepsis  - Pt w/ wbc of 13.68, Tachycardiac to 120s, LLL opacity on CXR cocerning for PNA.  - Pt initiated on ceftriaxone and azithromycin (12/7- )    CV  #Hemodynamics  - Pt became hypotensive to 60s/40s - pt was given 1 L NS w/o much response   - Pt started on levo gtt      Endocrine  #DM  - lantus 10 units QHS  - ISS    Psych  #Bipolar type II  - pt reportedly on lithium, abilify  - psych consult in AM      F: D5W  E: replete prn  N: NPO  DVT prophylaxis: lovenox  Dispo: MICU Pt is a 55 y/o F with hx of bipolar disorder type II, asthma, diabetes on insulin and metformin, HLD presenting with AMS possibly 2/2 to lithium toxicity. Pt admitted to MICU for further work up and management.    Neuro/psych  #Toxic metabolic encephalopathy 2/2 to possible lithium toxicity  - Pt presented with confusion and agitation to [facility]. Found to have lithium level 3.27.   - Pt s/p 3 L NS, ketamine, ativan, haldol in [facility]   - Pt arrived to [facility] alert, awake, agitated. Pt given versed, haldol.  - Poison control called - recommended fluids and monitoring. Consider renal consult.  - Lithium level improved to 2.32    Renal   #Hyponatremia  - Pt initially w/ Na of 124. Overcorrected with 3 L NS to 134. Repeat here at 139  - Possible 2/2 to SIADH from chronic lithium use  - Due to overcorrection - started on D5W.     #AMBER  - Resolved  - Likely prerenal w/ Cr 1.33 in setting of improvement s/p fluids  - continue to monitor  - f/u renal U/S    ID  #Sepsis  - Pt w/ wbc of 13.68, Tachycardiac to 120s, LLL opacity on CXR cocerning for PNA.  - Pt initiated on ceftriaxone and azithromycin (12/7- )    CV  #Hemodynamics  - Pt became hypotensive to 60s/40s - pt was given 1 L NS w/o much response   - Pt started on levo gtt      Endocrine  #DM  - lantus 10 units QHS  - ISS    Psych  #Bipolar type II  - pt on lithium, abilify,   - psych consult in AM      F: D5W  E: replete prn  N: NPO  DVT prophylaxis: lovenox  Dispo: MICU Pt is a 53 y/o F with hx of bipolar disorder type II, asthma, diabetes on insulin and metformin, HLD presenting with AMS possibly 2/2 to lithium toxicity. Pt admitted to MICU for further work up and management.    Neuro/psych  #Toxic metabolic encephalopathy 2/2 to possible lithium toxicity  - Pt presented with confusion and agitation to [facility]. Found to have lithium level 3.27.   - Pt s/p 3 L NS, ketamine, ativan, haldol in [facility]   - Pt arrived to [facility] alert, awake, agitated. Pt given versed, haldol.  - Poison control called - recommended fluids and monitoring. Consider renal consult.  - Lithium level improved to 2.32    Renal   #Hyponatremia  - Pt initially w/ Na of 124. Overcorrected with 3 L NS to 134. Repeat here at 139  - Possible 2/2 to SIADH from chronic lithium use  - Due to overcorrection - started on D5W.     #AMBER  - Resolved  - Likely prerenal w/ Cr 1.33 in setting of improvement s/p fluids  - continue to monitor  - f/u renal U/S    NAGMA  #possibly 2/2 to Type 1 RTA 2/2 to lithium given hypokalemia    ID  #Sepsis  - Pt w/ wbc of 13.68, Tachycardiac to 120s, LLL opacity on CXR cocerning for PNA.  - Pt initiated on ceftriaxone and azithromycin (12/7- )    CV  #Hemodynamics  - Pt became hypotensive to 60s/40s - pt was given 1 L NS w/o much response   - Pt started on levo gtt      Endocrine  #DM  - lantus 10 units QHS  - ISS    Psych  #Bipolar type II  - pt on lithium, abilify,   - psych consult in AM      F: D5W  E: replete prn  N: NPO  DVT prophylaxis: lovenox  Dispo: MICU Pt is a 53 y/o F with hx of bipolar disorder type II, asthma, diabetes on insulin and metformin, HLD presenting with AMS possibly 2/2 to lithium toxicity. Pt admitted to MICU for possible intubation for airway protection, further work up and management.    Neuro/psych  #Toxic metabolic encephalopathy 2/2 to possible lithium toxicity  - Pt presented with confusion and agitation to [facility]. Found to have lithium level 3.27.   - Pt s/p 3 L NS, ketamine, ativan, haldol in [facility]   - Pt arrived to [facility] alert, awake, agitated. Pt given versed, haldol.  - Poison control called - recommended fluids and monitoring. Consider renal consult.  - Lithium level improved to 2.32    Renal   #Hyponatremia  - Pt initially w/ Na of 124. Overcorrected with 3 L NS to 134. Repeat here at 139  - Possible 2/2 to SIADH from chronic lithium use  - Due to overcorrection - started on D5W.     #AMBER  - Resolved  - Likely prerenal w/ Cr 1.33 in setting of improvement s/p fluids  - continue to monitor  - f/u renal U/S    NAGMA  #possibly 2/2 to Type 1 RTA 2/2 to lithium given hypokalemia    ID  #Sepsis  - Pt w/ wbc of 13.68, Tachycardiac to 120s, LLL opacity on CXR cocerning for PNA.  - Pt initiated on ceftriaxone and azithromycin (12/7- )    CV  #Hemodynamics  - Pt became hypotensive to 60s/40s - pt was given 1 L NS w/o much response   - Pt started on levo gtt      Endocrine  #DM  - lantus 10 units QHS  - ISS    Psych  #Bipolar type II  - pt on lithium, abilify,   - psych consult in AM      F: D5W  E: replete prn  N: NPO  DVT prophylaxis: lovenox  Dispo: MICU

## 2019-12-07 NOTE — H&P ADULT - NSICDXPASTMEDICALHX_GEN_ALL_CORE_FT
PAST MEDICAL HISTORY:  Asthma     Bipolar 1 disorder     HLD (hyperlipidemia)     Mental health disorder     Skin rash     Type II diabetes mellitus

## 2019-12-07 NOTE — H&P ADULT - NSHPPHYSICALEXAM_GEN_ALL_CORE
VITAL SIGNS:  T(C): 36.2 (12-07-19 @ 01:05), Max: 37.9 (12-06-19 @ 09:54)  T(F): 97.1 (12-07-19 @ 01:05), Max: 100.2 (12-06-19 @ 09:54)  HR: 76 (12-07-19 @ 02:45) (76 - 123)  BP: 77/47 (12-07-19 @ 02:45) (75/43 - 939/42)  BP(mean): 61 (12-07-19 @ 02:45) (58 - 93)  RR: 18 (12-07-19 @ 02:30) (15 - 20)  SpO2: 98% (12-07-19 @ 02:45) (83% - 100%)  Wt(kg): --    PHYSICAL EXAM:    Constitutional: WDWN resting comfortably in bed; NAD. Hirsutism.  Head: NC/AT  Eyes: PERRL, EOMI, anicteric sclera  ENT: no nasal discharge; uvula midline, no oropharyngeal erythema or exudates; MMM  Neck: supple; no JVD or thyromegaly  Respiratory: CTA B/L; no W/R/R, no retractions  Cardiac: +S1/S2; RRR; no M/R/G  Gastrointestinal: abdomen soft, NT/ND; no rebound or guarding; +BSx4  Back: spine midline, no bony tenderness or step-offs; no CVAT B/L  Extremities: WWP, no clubbing or cyanosis; no peripheral edema  Musculoskeletal: NROM x4; no joint swelling, tenderness or erythema  Vascular: 2+ radial, DP/PT pulses B/L  Dermatologic: skin warm, dry and intact; no rashes, wounds, or scars  Lymphatic: no submandibular or cervical LAD  Neurologic: AAOx; CNII-XII grossly intact; no focal deficits VITAL SIGNS:  T(C): 36.2 (12-07-19 @ 01:05), Max: 37.9 (12-06-19 @ 09:54)  T(F): 97.1 (12-07-19 @ 01:05), Max: 100.2 (12-06-19 @ 09:54)  HR: 76 (12-07-19 @ 02:45) (76 - 123)  BP: 77/47 (12-07-19 @ 02:45) (75/43 - 939/42)  BP(mean): 61 (12-07-19 @ 02:45) (58 - 93)  RR: 18 (12-07-19 @ 02:30) (15 - 20)  SpO2: 98% (12-07-19 @ 02:45) (83% - 100%)  Wt(kg): --    PHYSICAL EXAM:    Constitutional: WDWN resting comfortably in bed; NAD.  Head: NC/AT  Eyes: PERRL, EOMI, anicteric sclera  ENT: no nasal discharge; uvula midline, no oropharyngeal erythema or exudates; MMM  Neck: supple; no JVD or thyromegaly  Respiratory: CTA B/L; no W/R/R, no retractions  Cardiac: +S1/S2; RRR; no M/R/G  Gastrointestinal: abdomen soft, NT, slight distension; no rebound or guarding; +BSx4  Back: spine midline, no bony tenderness or step-offs; no CVAT B/L  Extremities: WWP, no clubbing or cyanosis; no peripheral edema  Musculoskeletal: NROM x4; no joint swelling, tenderness or erythema  Vascular: 2+ radial, DP/PT pulses B/L  Dermatologic: skin warm, dry and intact; no rashes, wounds, or scars  Lymphatic: no submandibular or cervical LAD  Neurologic: AAOx2; CNII-XII grossly intact; no focal deficits VITAL SIGNS:  T(C): 36.2 (12-07-19 @ 01:05), Max: 37.9 (12-06-19 @ 09:54)  T(F): 97.1 (12-07-19 @ 01:05), Max: 100.2 (12-06-19 @ 09:54)  HR: 76 (12-07-19 @ 02:45) (76 - 123)  BP: 77/47 (12-07-19 @ 02:45) (75/43 - 939/42)  BP(mean): 61 (12-07-19 @ 02:45) (58 - 93)  RR: 18 (12-07-19 @ 02:30) (15 - 20)  SpO2: 98% (12-07-19 @ 02:45) (83% - 100%)  Wt(kg): --    PHYSICAL EXAM:    Constitutional: WDWN, slightly agitated  Head: NC/AT  Eyes: PERRL, EOMI, anicteric sclera  ENT: no nasal discharge; uvula midline, no oropharyngeal erythema or exudates; MMM  Neck: supple; no JVD or thyromegaly  Respiratory: CTA B/L; no W/R/R, no retractions  Cardiac: +S1/S2; RRR; no M/R/G  Gastrointestinal: abdomen soft, NT, slight distension; no rebound or guarding; +BSx4  Back: spine midline, no bony tenderness or step-offs; no CVAT B/L  Extremities: WWP, no clubbing or cyanosis; no peripheral edema  Musculoskeletal: NROM x4; no joint swelling, tenderness or erythema  Vascular: 2+ radial, DP/PT pulses B/L  Dermatologic: skin warm, dry and intact; no rashes, wounds, or scars  Lymphatic: no submandibular or cervical LAD  Neurologic: AAOx2; CNII-XII grossly intact; no focal deficits

## 2019-12-07 NOTE — H&P ADULT - NSHPLABSRESULTS_GEN_ALL_CORE
LABS:                        13.1   13.68 )-----------( 382      ( 06 Dec 2019 09:56 )             39.9     12-07    139  |  113<H>  |  5<L>  ----------------------------<  250<H>  3.8   |  19<L>  |  0.65    Ca    8.5      07 Dec 2019 01:19  Mg     1.9     12-06    TPro  5.8<L>  /  Alb  3.5  /  TBili  0.2  /  DBili  x   /  AST  30  /  ALT  15  /  AlkPhos  135<H>  12-07      Urinalysis Basic - ( 06 Dec 2019 10:38 )    Color: Yellow / Appearance: Clear / SG: <=1.005 / pH: x  Gluc: x / Ketone: Trace mg/dL  / Bili: NEGATIVE / Urobili: 0.2 E.U./dL   Blood: x / Protein: 30 mg/dL / Nitrite: NEGATIVE   Leuk Esterase: Moderate / RBC: 5-10 /HPF / WBC > 10 /HPF   Sq Epi: x / Non Sq Epi: Moderate /HPF / Bacteria: Many /HPF      CAPILLARY BLOOD GLUCOSE      POCT Blood Glucose.: 260 mg/dL (06 Dec 2019 10:12)      RADIOLOGY & ADDITIONAL TESTS: Reviewed.

## 2019-12-07 NOTE — PROGRESS NOTE ADULT - ASSESSMENT
ASSESSMENT:      PLAN: 	    Pulmonary    Neurology    Cardiovascular    ID    GI    Endocrine    Renal    Heme    Prophylactic Measure  F: IVF  E: Replete electrolytes to maintain K>4 and Mg>2  N:   Diet as tolerated  Lines:    VTE:     CODE    Dispo: Pt is a 55 y/o F with hx of bipolar disorder type II, asthma, diabetes on insulin and metformin, HLD presenting with AMS possibly 2/2 to lithium toxicity. Pt admitted to MICU for possible intubation for airway protection, further work up and management.    Neuro/psych  #Toxic metabolic encephalopathy 2/2 to possible lithium toxicity  - Pt presented with confusion and agitation to [facility]. Found to have lithium level 3.27.   - Pt s/p 3 L NS, ketamine, ativan, haldol in [facility]   - Pt arrived to [facility] alert, awake, agitated. Pt given versed, haldol.  - Poison control called - recommended fluids and monitoring.  - Lithium level improved to 2.32, resolved to wnl at .97 - poison control notified   - continues to be incredibly agitated necessitating precedex with levophed for pressures support  - Psychiatry consulted; f//u recs     Renal   #Hyponatremia  - Pt initially w/ Na of 124. Overcorrected with 3 L NS to 134. Repeat here at 139  - Possible 2/2 to SIADH from chronic lithium use  - Due to overcorrection - started on D5W.  - currently at 141    ID  #Sepsis  - Pt w/ wbc of 13.68, Tachycardiac to 120s, LLL opacity on CXR cocerning for PNA.  - currently resolved, abx discontinued     CV  #Hemodynamics  - Pt became hypotensive to 60s/40s - pt was given 1 L NS w/o much response   - Pt started on levo gtt, continue to monitor       Endocrine  #DM  - lantus 10 units QHS  - ISS    Psych  #Bipolar type II  - pt on lithium, abilify,   - psych consult as above       F: D5W  E: replete prn  N: NPO  DVT prophylaxis: lovenox  Dispo: MICU

## 2019-12-07 NOTE — H&P ADULT - NSHPSOCIALHISTORY_GEN_ALL_CORE
Pt lives at mental health [place]. Unable to Pt lives at mental health center. Unable to obtain social hx 2/2 to pt's mental status

## 2019-12-07 NOTE — H&P ADULT - HISTORY OF PRESENT ILLNESS
Pt is a 53 y/o F with hx of bipolar disorder type II, asthma, diabetes on insulin and metformin, HLD [facility] Pt is a 53 y/o F with hx of bipolar disorder type II, asthma, diabetes on insulin and metformin, HLD presented to [facility] from [facility] for AMS - exhibiting confusion and agitation. Unclear baseline although pt with low intellectual functioning. Difficult to obtain a hx from pt due to pt was given sedatives. Per [facility], pt was uncontrollably shaking during the interview, pt was fully conscious.    [facility].2, /81, , RR 16, O2 sat 97% on RA. Labs significant for wbc 13.68, Na 124, K 3.2, Cr 1.33, lithium 3.27, glucose 289. ABG (most likely vbg) w/ pH 7.26, pCO2 51, U/A positive although moderate epithelial cells present. CT Head negative, CXR w/ LLL opacity. Pt was given ativan, haldol and ketamine. Pt is a 55 y/o F with hx of bipolar disorder type II, asthma, diabetes on insulin and metformin, HLD presented to [facility] from [facility] for AMS - exhibiting confusion and agitation. Unclear baseline although pt with low intellectual functioning. Difficult to obtain a hx from pt due to pt was given sedatives. Per [facility], pt was uncontrollably shaking during the interview, pt was fully conscious.    [facility] 100.2, /81, , RR 16, O2 sat 97% on RA. Labs significant for wbc 13.68, Na 124, K 3.2, Cr 1.33, lithium 3.27, glucose 289. ABG (most likely vbg) w/ pH 7.26, pCO2 51, U/A positive although moderate epithelial cells present. CT Head negative, CXR w/ LLL opacity. Pt was given ativan, haldol and ketamine and 3L NS. Pt is a 55 y/o F with hx of bipolar disorder type II, asthma, diabetes on insulin and metformin, HLD presented to [facility] from [facility] for AMS - exhibiting confusion and agitation. Unclear baseline although pt with low intellectual functioning. Difficult to obtain a hx from pt due to pt was given sedatives and underlying mental disorder. Per [facility], pt was uncontrollably shaking during the interview, pt was fully conscious.    [facility] 100.2, /81, , RR 16, O2 sat 97% on RA. Labs significant for wbc 13.68, Na 124, K 3.2, Cr 1.33, lithium 3.27, glucose 289. ABG (most likely vbg) w/ pH 7.26, pCO2 51, U/A positive although moderate epithelial cells present. CT Head negative, CXR w/ LLL opacity. Pt was given ativan, haldol and ketamine and 3L NS. Pt is a 55 y/o F with hx of bipolar disorder type II, asthma, diabetes on insulin and metformin, HLD presented to [facility] from [facility] for AMS - exhibiting confusion and agitation, shaking uncontrollably. Unclear baseline although pt with low intellectual functioning. Difficult to obtain a hx from pt due to pt was given sedatives and underlying mental disorder. Per [facility], pt was uncontrollably shaking during the interview, pt was fully conscious.    In [facility], vitals Tmax 100.2, /81, , RR 16, O2 sat 97% on RA. Labs significant for wbc 13.68, Na 124, K 3.2, Cr 1.33, lithium 3.27, glucose 289. ABG (most likely vbg) w/ pH 7.26, pCO2 51, U/A positive although moderate epithelial cells present. CT Head negative, CXR w/ LLL opacity. Pt was given ativan, haldol and ketamine and 3L NS.

## 2019-12-07 NOTE — PROGRESS NOTE ADULT - SUBJECTIVE AND OBJECTIVE BOX
PROGRESS NOTE    CC:  Overnight Events:  Interval History:   ROS:    OBJECTIVE  Vitals:  ICU Vital Signs Last 24 Hrs  T(C): 36.4 (07 Dec 2019 07:05), Max: 37.9 (06 Dec 2019 09:54)  T(F): 97.6 (07 Dec 2019 07:05), Max: 100.2 (06 Dec 2019 09:54)  HR: 100 (07 Dec 2019 09:10) (72 - 123)  BP: 100/53 (07 Dec 2019 09:10) (75/43 - 939/42)  BP(mean): 69 (07 Dec 2019 09:10) (58 - 93)  ABP: --  ABP(mean): --  RR: 22 (07 Dec 2019 08:20) (14 - 22)  SpO2: 97% (07 Dec 2019 09:10) (83% - 100%)      I/O:  I&O's Summary    06 Dec 2019 07:01  -  07 Dec 2019 07:00  --------------------------------------------------------  IN: 2377.8 mL / OUT: 1200 mL / NET: 1177.8 mL    07 Dec 2019 07:01  -  07 Dec 2019 09:42  --------------------------------------------------------  IN: 212.9 mL / OUT: 675 mL / NET: -462.1 mL        PHYSICAL EXAM:  Appearance: NAD. Speaking in full sentences.   HEENT:   Conjunctiva clear b/l. Moist oral mucosa.  Cardiovascular: RRR with no murmurs.  Respiratory: Lungs CTAB.   Gastrointestinal:  Soft, nontender. Non-distended. Non-rigid.	  Extremities: No edema b/l. No erythema b/l. LE WWP b/l.  Vascular: DP 2+ b/l.  Neurologic:  Alert and awake. Moving all extremities. Following commands. Making eye contact.  	  LABS:                        11.0   10.75 )-----------( 289      ( 07 Dec 2019 05:42 )             35.4     12-07    137  |  110<H>  |  4<L>  ----------------------------<  323<H>  4.0   |  20<L>  |  0.66    Ca    8.6      07 Dec 2019 05:42  Phos  2.1     12-07  Mg     1.8     12-07    TPro  5.8<L>  /  Alb  3.5  /  TBili  0.4  /  DBili  x   /  AST  29  /  ALT  16  /  AlkPhos  151<H>  12-07    PT/INR - ( 07 Dec 2019 05:42 )   PT: 12.7 sec;   INR: 1.12          PTT - ( 07 Dec 2019 05:42 )  PTT:27.4 sec  Urinalysis Basic - ( 06 Dec 2019 10:38 )    Color: Yellow / Appearance: Clear / SG: <=1.005 / pH: x  Gluc: x / Ketone: Trace mg/dL  / Bili: NEGATIVE / Urobili: 0.2 E.U./dL   Blood: x / Protein: 30 mg/dL / Nitrite: NEGATIVE   Leuk Esterase: Moderate / RBC: 5-10 /HPF / WBC > 10 /HPF   Sq Epi: x / Non Sq Epi: Moderate /HPF / Bacteria: Many /HPF        RADIOLOGY & ADDITIONAL TESTS:  Reviewed.    MEDICATIONS  (STANDING):  aspirin enteric coated 81 milliGRAM(s) Oral daily  azithromycin  IVPB 500 milliGRAM(s) IV Intermittent every 24 hours  cefTRIAXone   IVPB 2000 milliGRAM(s) IV Intermittent every 24 hours  chlorhexidine 2% Cloths 1 Application(s) Topical <User Schedule>  cyanocobalamin 1000 MICROGram(s) Oral daily  dexMEDEtomidine Infusion 0.4 MICROgram(s)/kG/Hr (8.62 mL/Hr) IV Continuous <Continuous>  dextrose 5%. 1000 milliLiter(s) (50 mL/Hr) IV Continuous <Continuous>  dextrose 5%. 1000 milliLiter(s) (100 mL/Hr) IV Continuous <Continuous>  dextrose 5%. 1000 milliLiter(s) (500 mL/Hr) IV Continuous <Continuous>  dextrose 50% Injectable 12.5 Gram(s) IV Push once  dextrose 50% Injectable 25 Gram(s) IV Push once  dextrose 50% Injectable 25 Gram(s) IV Push once  enoxaparin Injectable 40 milliGRAM(s) SubCutaneous every 24 hours  folic acid 1 milliGRAM(s) Oral daily  insulin glargine Injectable (LANTUS) 10 Unit(s) SubCutaneous at bedtime  insulin lispro (HumaLOG) corrective regimen sliding scale   SubCutaneous Before meals and at bedtime  multivitamin 1 Tablet(s) Oral daily  norepinephrine Infusion 0.05 MICROgram(s)/kG/Min (8.081 mL/Hr) IV Continuous <Continuous>  simvastatin 20 milliGRAM(s) Oral at bedtime    MEDICATIONS  (PRN):  dextrose 40% Gel 15 Gram(s) Oral once PRN Blood Glucose LESS THAN 70 milliGRAM(s)/deciliter  glucagon  Injectable 1 milliGRAM(s) IntraMuscular once PRN Glucose LESS THAN 70 milligrams/deciliter  haloperidol    Injectable 5 milliGRAM(s) IV Push every 6 hours PRN Agitation OVERNIGHT EVENTS: Patient admitted overnight. Increasingly agitated and given Haldol 2 mg x2 and versed 2 mg x2 with resolution in agitation.     SUBJECTIVE / INTERVAL HPI: Patient seen and examined at bedside. Incredibly a      PHYSICAL EXAM:    General: WDWN  HEENT: NC/AT; PERRL, anicteric sclera; MMM  Neck: supple  Cardiovascular: +S1/S2, RRR  Respiratory: CTA B/L; no W/R/R  Gastrointestinal: soft, NT/ND; +BSx4  Extremities: WWP; no edema, clubbing or cyanosis  Vascular: 2+ radial, DP/PT pulses B/L  Neurological: AAOx3; no focal deficits  Psychiatric: pleasant mood and affect  Dermatologic: no appreciable wounds or damage to the skin    VITAL SIGNS:  Vital Signs Last 24 Hrs  T(C): 36.6 (07 Dec 2019 18:16), Max: 37.5 (06 Dec 2019 21:08)  T(F): 97.9 (07 Dec 2019 18:16), Max: 99.5 (06 Dec 2019 21:08)  HR: 96 (07 Dec 2019 15:00) (72 - 112)  BP: 119/55 (07 Dec 2019 15:00) (65/38 - 939/42)  BP(mean): 78 (07 Dec 2019 15:00) (50 - 93)  RR: 23 (07 Dec 2019 15:00) (10 - 23)  SpO2: 99% (07 Dec 2019 15:00) (83% - 100%)      MEDICATIONS:  MEDICATIONS  (STANDING):  aspirin enteric coated 81 milliGRAM(s) Oral daily  chlorhexidine 2% Cloths 1 Application(s) Topical <User Schedule>  cyanocobalamin 1000 MICROGram(s) Oral daily  dexMEDEtomidine Infusion 0.4 MICROgram(s)/kG/Hr (8.62 mL/Hr) IV Continuous <Continuous>  dextrose 5%. 1000 milliLiter(s) (50 mL/Hr) IV Continuous <Continuous>  dextrose 50% Injectable 12.5 Gram(s) IV Push once  dextrose 50% Injectable 25 Gram(s) IV Push once  dextrose 50% Injectable 25 Gram(s) IV Push once  enoxaparin Injectable 40 milliGRAM(s) SubCutaneous every 24 hours  folic acid 1 milliGRAM(s) Oral daily  insulin glargine Injectable (LANTUS) 10 Unit(s) SubCutaneous at bedtime  insulin lispro (HumaLOG) corrective regimen sliding scale   SubCutaneous Before meals and at bedtime  multivitamin 1 Tablet(s) Oral daily  norepinephrine Infusion 0.05 MICROgram(s)/kG/Min (8.081 mL/Hr) IV Continuous <Continuous>  simvastatin 20 milliGRAM(s) Oral at bedtime    MEDICATIONS  (PRN):  dextrose 40% Gel 15 Gram(s) Oral once PRN Blood Glucose LESS THAN 70 milliGRAM(s)/deciliter  glucagon  Injectable 1 milliGRAM(s) IntraMuscular once PRN Glucose LESS THAN 70 milligrams/deciliter  haloperidol    Injectable 5 milliGRAM(s) IV Push every 6 hours PRN Agitation      ALLERGIES:  Allergies    Allergy Status Unknown    Intolerances        LABS:                        11.0   10.75 )-----------( 289      ( 07 Dec 2019 05:42 )             35.4     12-07    137  |  110<H>  |  4<L>  ----------------------------<  323<H>  4.0   |  20<L>  |  0.66    Ca    8.6      07 Dec 2019 05:42  Phos  2.1     12-07  Mg     1.8     12-07    TPro  5.8<L>  /  Alb  3.5  /  TBili  0.4  /  DBili  x   /  AST  29  /  ALT  16  /  AlkPhos  151<H>  12-07    PT/INR - ( 07 Dec 2019 05:42 )   PT: 12.7 sec;   INR: 1.12          PTT - ( 07 Dec 2019 05:42 )  PTT:27.4 sec  Urinalysis Basic - ( 06 Dec 2019 10:38 )    Color: Yellow / Appearance: Clear / SG: <=1.005 / pH: x  Gluc: x / Ketone: Trace mg/dL  / Bili: NEGATIVE / Urobili: 0.2 E.U./dL   Blood: x / Protein: 30 mg/dL / Nitrite: NEGATIVE   Leuk Esterase: Moderate / RBC: 5-10 /HPF / WBC > 10 /HPF   Sq Epi: x / Non Sq Epi: Moderate /HPF / Bacteria: Many /HPF      CAPILLARY BLOOD GLUCOSE      POCT Blood Glucose.: 190 mg/dL (07 Dec 2019 17:20)      RADIOLOGY & ADDITIONAL TESTS: Reviewed. OVERNIGHT EVENTS: Patient admitted overnight. Increasingly agitated and given Haldol 2 mg x2 and versed 2 mg x2 with resolution in agitation.     SUBJECTIVE / INTERVAL HPI: Patient seen and examined at bedside. Incredibly agitated and non sensical. Refuses to participate with majority of exam.       PHYSICAL EXAM:    General: WDWN  HEENT: NC/AT; PERRL, anicteric sclera; MMM  Neck: supple  Cardiovascular: +S1/S2, RRR  Respiratory: CTA B/L; no W/R/R  Gastrointestinal: soft, NT/ND; +BSx4  Extremities: WWP; no edema, clubbing or cyanosis  Vascular: 2+ radial, DP/PT pulses B/L  Neurological: AAOx3; no focal deficits  Psychiatric: pleasant mood and affect  Dermatologic: no appreciable wounds or damage to the skin    VITAL SIGNS:  Vital Signs Last 24 Hrs  T(C): 36.6 (07 Dec 2019 18:16), Max: 37.5 (06 Dec 2019 21:08)  T(F): 97.9 (07 Dec 2019 18:16), Max: 99.5 (06 Dec 2019 21:08)  HR: 96 (07 Dec 2019 15:00) (72 - 112)  BP: 119/55 (07 Dec 2019 15:00) (65/38 - 939/42)  BP(mean): 78 (07 Dec 2019 15:00) (50 - 93)  RR: 23 (07 Dec 2019 15:00) (10 - 23)  SpO2: 99% (07 Dec 2019 15:00) (83% - 100%)      MEDICATIONS:  MEDICATIONS  (STANDING):  aspirin enteric coated 81 milliGRAM(s) Oral daily  chlorhexidine 2% Cloths 1 Application(s) Topical <User Schedule>  cyanocobalamin 1000 MICROGram(s) Oral daily  dexMEDEtomidine Infusion 0.4 MICROgram(s)/kG/Hr (8.62 mL/Hr) IV Continuous <Continuous>  dextrose 5%. 1000 milliLiter(s) (50 mL/Hr) IV Continuous <Continuous>  dextrose 50% Injectable 12.5 Gram(s) IV Push once  dextrose 50% Injectable 25 Gram(s) IV Push once  dextrose 50% Injectable 25 Gram(s) IV Push once  enoxaparin Injectable 40 milliGRAM(s) SubCutaneous every 24 hours  folic acid 1 milliGRAM(s) Oral daily  insulin glargine Injectable (LANTUS) 10 Unit(s) SubCutaneous at bedtime  insulin lispro (HumaLOG) corrective regimen sliding scale   SubCutaneous Before meals and at bedtime  multivitamin 1 Tablet(s) Oral daily  norepinephrine Infusion 0.05 MICROgram(s)/kG/Min (8.081 mL/Hr) IV Continuous <Continuous>  simvastatin 20 milliGRAM(s) Oral at bedtime    MEDICATIONS  (PRN):  dextrose 40% Gel 15 Gram(s) Oral once PRN Blood Glucose LESS THAN 70 milliGRAM(s)/deciliter  glucagon  Injectable 1 milliGRAM(s) IntraMuscular once PRN Glucose LESS THAN 70 milligrams/deciliter  haloperidol    Injectable 5 milliGRAM(s) IV Push every 6 hours PRN Agitation      ALLERGIES:  Allergies    Allergy Status Unknown    Intolerances        LABS:                        11.0   10.75 )-----------( 289      ( 07 Dec 2019 05:42 )             35.4     12-07    137  |  110<H>  |  4<L>  ----------------------------<  323<H>  4.0   |  20<L>  |  0.66    Ca    8.6      07 Dec 2019 05:42  Phos  2.1     12-07  Mg     1.8     12-07    TPro  5.8<L>  /  Alb  3.5  /  TBili  0.4  /  DBili  x   /  AST  29  /  ALT  16  /  AlkPhos  151<H>  12-07    PT/INR - ( 07 Dec 2019 05:42 )   PT: 12.7 sec;   INR: 1.12          PTT - ( 07 Dec 2019 05:42 )  PTT:27.4 sec  Urinalysis Basic - ( 06 Dec 2019 10:38 )    Color: Yellow / Appearance: Clear / SG: <=1.005 / pH: x  Gluc: x / Ketone: Trace mg/dL  / Bili: NEGATIVE / Urobili: 0.2 E.U./dL   Blood: x / Protein: 30 mg/dL / Nitrite: NEGATIVE   Leuk Esterase: Moderate / RBC: 5-10 /HPF / WBC > 10 /HPF   Sq Epi: x / Non Sq Epi: Moderate /HPF / Bacteria: Many /HPF      CAPILLARY BLOOD GLUCOSE      POCT Blood Glucose.: 190 mg/dL (07 Dec 2019 17:20)      RADIOLOGY & ADDITIONAL TESTS: Reviewed. OVERNIGHT EVENTS: Patient admitted overnight. Increasingly agitated and given Haldol 2 mg x2 and versed 2 mg x2 with resolution in agitation.     SUBJECTIVE / INTERVAL HPI: Patient seen and examined at bedside. Incredibly agitated and non sensical. Refuses to participate with majority of exam.       PHYSICAL EXAM:    General: WDWN  HEENT: NC/AT; PERRL, anicteric sclera; MMM  Neck: supple  Cardiovascular: Refused  Respiratory: Refused  Gastrointestinal: Refused  Extremities: WWP; no edema, clubbing or cyanosis  Vascular: Refused  Neurological: AAOx1; unable to assess secondary to patient non compliance   Psychiatric: agitated and combative mood and affect   Dermatologic: no appreciable wounds or damage to the skin    VITAL SIGNS:  Vital Signs Last 24 Hrs  T(C): 36.6 (07 Dec 2019 18:16), Max: 37.5 (06 Dec 2019 21:08)  T(F): 97.9 (07 Dec 2019 18:16), Max: 99.5 (06 Dec 2019 21:08)  HR: 96 (07 Dec 2019 15:00) (72 - 112)  BP: 119/55 (07 Dec 2019 15:00) (65/38 - 939/42)  BP(mean): 78 (07 Dec 2019 15:00) (50 - 93)  RR: 23 (07 Dec 2019 15:00) (10 - 23)  SpO2: 99% (07 Dec 2019 15:00) (83% - 100%)      MEDICATIONS:  MEDICATIONS  (STANDING):  aspirin enteric coated 81 milliGRAM(s) Oral daily  chlorhexidine 2% Cloths 1 Application(s) Topical <User Schedule>  cyanocobalamin 1000 MICROGram(s) Oral daily  dexMEDEtomidine Infusion 0.4 MICROgram(s)/kG/Hr (8.62 mL/Hr) IV Continuous <Continuous>  dextrose 5%. 1000 milliLiter(s) (50 mL/Hr) IV Continuous <Continuous>  dextrose 50% Injectable 12.5 Gram(s) IV Push once  dextrose 50% Injectable 25 Gram(s) IV Push once  dextrose 50% Injectable 25 Gram(s) IV Push once  enoxaparin Injectable 40 milliGRAM(s) SubCutaneous every 24 hours  folic acid 1 milliGRAM(s) Oral daily  insulin glargine Injectable (LANTUS) 10 Unit(s) SubCutaneous at bedtime  insulin lispro (HumaLOG) corrective regimen sliding scale   SubCutaneous Before meals and at bedtime  multivitamin 1 Tablet(s) Oral daily  norepinephrine Infusion 0.05 MICROgram(s)/kG/Min (8.081 mL/Hr) IV Continuous <Continuous>  simvastatin 20 milliGRAM(s) Oral at bedtime    MEDICATIONS  (PRN):  dextrose 40% Gel 15 Gram(s) Oral once PRN Blood Glucose LESS THAN 70 milliGRAM(s)/deciliter  glucagon  Injectable 1 milliGRAM(s) IntraMuscular once PRN Glucose LESS THAN 70 milligrams/deciliter  haloperidol    Injectable 5 milliGRAM(s) IV Push every 6 hours PRN Agitation      ALLERGIES:  Allergies    Allergy Status Unknown    Intolerances        LABS:                        11.0   10.75 )-----------( 289      ( 07 Dec 2019 05:42 )             35.4     12-07    137  |  110<H>  |  4<L>  ----------------------------<  323<H>  4.0   |  20<L>  |  0.66    Ca    8.6      07 Dec 2019 05:42  Phos  2.1     12-07  Mg     1.8     12-07    TPro  5.8<L>  /  Alb  3.5  /  TBili  0.4  /  DBili  x   /  AST  29  /  ALT  16  /  AlkPhos  151<H>  12-07    PT/INR - ( 07 Dec 2019 05:42 )   PT: 12.7 sec;   INR: 1.12          PTT - ( 07 Dec 2019 05:42 )  PTT:27.4 sec  Urinalysis Basic - ( 06 Dec 2019 10:38 )    Color: Yellow / Appearance: Clear / SG: <=1.005 / pH: x  Gluc: x / Ketone: Trace mg/dL  / Bili: NEGATIVE / Urobili: 0.2 E.U./dL   Blood: x / Protein: 30 mg/dL / Nitrite: NEGATIVE   Leuk Esterase: Moderate / RBC: 5-10 /HPF / WBC > 10 /HPF   Sq Epi: x / Non Sq Epi: Moderate /HPF / Bacteria: Many /HPF      CAPILLARY BLOOD GLUCOSE      POCT Blood Glucose.: 190 mg/dL (07 Dec 2019 17:20)      RADIOLOGY & ADDITIONAL TESTS: Reviewed.

## 2019-12-07 NOTE — H&P ADULT - NSHPREVIEWOFSYSTEMS_GEN_ALL_CORE
unable to obtain accurate ROS. CONSTITUTIONAL: No weakness, fevers or chills  EYES/ENT: No visual changes;  No vertigo or throat pain   NECK: No pain or stiffness  RESPIRATORY: No cough, wheezing, hemoptysis; No shortness of breath  CARDIOVASCULAR: No chest pain or palpitations  GASTROINTESTINAL: No abdominal or epigastric pain. No nausea, vomiting, or hematemesis; No diarrhea or constipation. No melena or hematochezia.  GENITOURINARY: No dysuria, frequency or hematuria  NEUROLOGICAL: No numbness or weakness  SKIN: No itching, rashes

## 2019-12-08 DIAGNOSIS — R63.8 OTHER SYMPTOMS AND SIGNS CONCERNING FOOD AND FLUID INTAKE: ICD-10-CM

## 2019-12-08 DIAGNOSIS — J45.909 UNSPECIFIED ASTHMA, UNCOMPLICATED: ICD-10-CM

## 2019-12-08 DIAGNOSIS — F31.81 BIPOLAR II DISORDER: ICD-10-CM

## 2019-12-08 DIAGNOSIS — L40.9 PSORIASIS, UNSPECIFIED: ICD-10-CM

## 2019-12-08 DIAGNOSIS — E78.5 HYPERLIPIDEMIA, UNSPECIFIED: ICD-10-CM

## 2019-12-08 DIAGNOSIS — E11.9 TYPE 2 DIABETES MELLITUS WITHOUT COMPLICATIONS: ICD-10-CM

## 2019-12-08 DIAGNOSIS — Z91.89 OTHER SPECIFIED PERSONAL RISK FACTORS, NOT ELSEWHERE CLASSIFIED: ICD-10-CM

## 2019-12-08 DIAGNOSIS — T56.891A TOXIC EFFECT OF OTHER METALS, ACCIDENTAL (UNINTENTIONAL), INITIAL ENCOUNTER: ICD-10-CM

## 2019-12-08 DIAGNOSIS — E87.1 HYPO-OSMOLALITY AND HYPONATREMIA: ICD-10-CM

## 2019-12-08 DIAGNOSIS — D64.9 ANEMIA, UNSPECIFIED: ICD-10-CM

## 2019-12-08 LAB
ANION GAP SERPL CALC-SCNC: 6 MMOL/L — SIGNIFICANT CHANGE UP (ref 5–17)
BASOPHILS # BLD AUTO: 0.03 K/UL — SIGNIFICANT CHANGE UP (ref 0–0.2)
BASOPHILS NFR BLD AUTO: 0.3 % — SIGNIFICANT CHANGE UP (ref 0–2)
BUN SERPL-MCNC: 4 MG/DL — LOW (ref 7–23)
CALCIUM SERPL-MCNC: 8.3 MG/DL — LOW (ref 8.4–10.5)
CHLORIDE SERPL-SCNC: 114 MMOL/L — HIGH (ref 96–108)
CO2 SERPL-SCNC: 22 MMOL/L — SIGNIFICANT CHANGE UP (ref 22–31)
CREAT SERPL-MCNC: 0.56 MG/DL — SIGNIFICANT CHANGE UP (ref 0.5–1.3)
EOSINOPHIL # BLD AUTO: 0.3 K/UL — SIGNIFICANT CHANGE UP (ref 0–0.5)
EOSINOPHIL NFR BLD AUTO: 3.4 % — SIGNIFICANT CHANGE UP (ref 0–6)
GLUCOSE BLDC GLUCOMTR-MCNC: 117 MG/DL — HIGH (ref 70–99)
GLUCOSE BLDC GLUCOMTR-MCNC: 120 MG/DL — HIGH (ref 70–99)
GLUCOSE BLDC GLUCOMTR-MCNC: 183 MG/DL — HIGH (ref 70–99)
GLUCOSE BLDC GLUCOMTR-MCNC: 208 MG/DL — HIGH (ref 70–99)
GLUCOSE BLDC GLUCOMTR-MCNC: 405 MG/DL — HIGH (ref 70–99)
GLUCOSE BLDC GLUCOMTR-MCNC: 447 MG/DL — HIGH (ref 70–99)
GLUCOSE SERPL-MCNC: 167 MG/DL — HIGH (ref 70–99)
HCT VFR BLD CALC: 34.6 % — SIGNIFICANT CHANGE UP (ref 34.5–45)
HGB BLD-MCNC: 10.8 G/DL — LOW (ref 11.5–15.5)
IMM GRANULOCYTES NFR BLD AUTO: 0.3 % — SIGNIFICANT CHANGE UP (ref 0–1.5)
LITHIUM SERPL-MCNC: 1.17 MMOL/L — SIGNIFICANT CHANGE UP (ref 0.6–1.2)
LYMPHOCYTES # BLD AUTO: 2.12 K/UL — SIGNIFICANT CHANGE UP (ref 1–3.3)
LYMPHOCYTES # BLD AUTO: 23.7 % — SIGNIFICANT CHANGE UP (ref 13–44)
MAGNESIUM SERPL-MCNC: 1.6 MG/DL — SIGNIFICANT CHANGE UP (ref 1.6–2.6)
MCHC RBC-ENTMCNC: 29 PG — SIGNIFICANT CHANGE UP (ref 27–34)
MCHC RBC-ENTMCNC: 31.2 GM/DL — LOW (ref 32–36)
MCV RBC AUTO: 92.8 FL — SIGNIFICANT CHANGE UP (ref 80–100)
MONOCYTES # BLD AUTO: 0.68 K/UL — SIGNIFICANT CHANGE UP (ref 0–0.9)
MONOCYTES NFR BLD AUTO: 7.6 % — SIGNIFICANT CHANGE UP (ref 2–14)
NEUTROPHILS # BLD AUTO: 5.78 K/UL — SIGNIFICANT CHANGE UP (ref 1.8–7.4)
NEUTROPHILS NFR BLD AUTO: 64.7 % — SIGNIFICANT CHANGE UP (ref 43–77)
NRBC # BLD: 0 /100 WBCS — SIGNIFICANT CHANGE UP (ref 0–0)
OSMOLALITY UR: 162 MOSM/KG — SIGNIFICANT CHANGE UP (ref 50–1200)
PHOSPHATE SERPL-MCNC: 2.4 MG/DL — LOW (ref 2.5–4.5)
PLATELET # BLD AUTO: 254 K/UL — SIGNIFICANT CHANGE UP (ref 150–400)
POTASSIUM SERPL-MCNC: 3.2 MMOL/L — LOW (ref 3.5–5.3)
POTASSIUM SERPL-SCNC: 3.2 MMOL/L — LOW (ref 3.5–5.3)
RBC # BLD: 3.73 M/UL — LOW (ref 3.8–5.2)
RBC # FLD: 14.1 % — SIGNIFICANT CHANGE UP (ref 10.3–14.5)
SODIUM SERPL-SCNC: 142 MMOL/L — SIGNIFICANT CHANGE UP (ref 135–145)
WBC # BLD: 8.94 K/UL — SIGNIFICANT CHANGE UP (ref 3.8–10.5)
WBC # FLD AUTO: 8.94 K/UL — SIGNIFICANT CHANGE UP (ref 3.8–10.5)

## 2019-12-08 PROCEDURE — 99233 SBSQ HOSP IP/OBS HIGH 50: CPT | Mod: GC

## 2019-12-08 PROCEDURE — 71045 X-RAY EXAM CHEST 1 VIEW: CPT | Mod: 26

## 2019-12-08 RX ORDER — ACETAMINOPHEN 500 MG
650 TABLET ORAL ONCE
Refills: 0 | Status: COMPLETED | OUTPATIENT
Start: 2019-12-08 | End: 2019-12-08

## 2019-12-08 RX ORDER — MAGNESIUM SULFATE 500 MG/ML
2 VIAL (ML) INJECTION ONCE
Refills: 0 | Status: COMPLETED | OUTPATIENT
Start: 2019-12-08 | End: 2019-12-08

## 2019-12-08 RX ORDER — INSULIN LISPRO 100/ML
6 VIAL (ML) SUBCUTANEOUS
Refills: 0 | Status: DISCONTINUED | OUTPATIENT
Start: 2019-12-08 | End: 2019-12-10

## 2019-12-08 RX ORDER — HUMAN INSULIN 100 [IU]/ML
10 INJECTION, SUSPENSION SUBCUTANEOUS ONCE
Refills: 0 | Status: COMPLETED | OUTPATIENT
Start: 2019-12-08 | End: 2019-12-08

## 2019-12-08 RX ORDER — ALBUTEROL 90 UG/1
2 AEROSOL, METERED ORAL EVERY 6 HOURS
Refills: 0 | Status: DISCONTINUED | OUTPATIENT
Start: 2019-12-08 | End: 2019-12-10

## 2019-12-08 RX ORDER — SENNA PLUS 8.6 MG/1
1 TABLET ORAL DAILY
Refills: 0 | Status: DISCONTINUED | OUTPATIENT
Start: 2019-12-08 | End: 2019-12-10

## 2019-12-08 RX ORDER — POLYETHYLENE GLYCOL 3350 17 G/17G
17 POWDER, FOR SOLUTION ORAL DAILY
Refills: 0 | Status: DISCONTINUED | OUTPATIENT
Start: 2019-12-08 | End: 2019-12-10

## 2019-12-08 RX ORDER — PETROLATUM,WHITE
1 JELLY (GRAM) TOPICAL
Refills: 0 | Status: DISCONTINUED | OUTPATIENT
Start: 2019-12-08 | End: 2019-12-10

## 2019-12-08 RX ORDER — INFLUENZA VIRUS VACCINE 15; 15; 15; 15 UG/.5ML; UG/.5ML; UG/.5ML; UG/.5ML
0.5 SUSPENSION INTRAMUSCULAR ONCE
Refills: 0 | Status: DISCONTINUED | OUTPATIENT
Start: 2019-12-08 | End: 2019-12-10

## 2019-12-08 RX ORDER — INSULIN GLARGINE 100 [IU]/ML
20 INJECTION, SOLUTION SUBCUTANEOUS AT BEDTIME
Refills: 0 | Status: DISCONTINUED | OUTPATIENT
Start: 2019-12-08 | End: 2019-12-10

## 2019-12-08 RX ORDER — POTASSIUM CHLORIDE 20 MEQ
40 PACKET (EA) ORAL EVERY 4 HOURS
Refills: 0 | Status: COMPLETED | OUTPATIENT
Start: 2019-12-08 | End: 2019-12-08

## 2019-12-08 RX ADMIN — QUETIAPINE FUMARATE 25 MILLIGRAM(S): 200 TABLET, FILM COATED ORAL at 11:24

## 2019-12-08 RX ADMIN — HUMAN INSULIN 10 UNIT(S): 100 INJECTION, SUSPENSION SUBCUTANEOUS at 13:55

## 2019-12-08 RX ADMIN — Medication 40 MILLIEQUIVALENT(S): at 11:24

## 2019-12-08 RX ADMIN — Medication 650 MILLIGRAM(S): at 18:39

## 2019-12-08 RX ADMIN — SIMVASTATIN 20 MILLIGRAM(S): 20 TABLET, FILM COATED ORAL at 22:14

## 2019-12-08 RX ADMIN — Medication 81 MILLIGRAM(S): at 11:24

## 2019-12-08 RX ADMIN — Medication 40 MILLIEQUIVALENT(S): at 07:44

## 2019-12-08 RX ADMIN — Medication 12: at 11:57

## 2019-12-08 RX ADMIN — ENOXAPARIN SODIUM 40 MILLIGRAM(S): 100 INJECTION SUBCUTANEOUS at 06:49

## 2019-12-08 RX ADMIN — CHLORHEXIDINE GLUCONATE 1 APPLICATION(S): 213 SOLUTION TOPICAL at 06:50

## 2019-12-08 RX ADMIN — Medication 6 UNIT(S): at 17:37

## 2019-12-08 RX ADMIN — Medication 50 GRAM(S): at 07:44

## 2019-12-08 RX ADMIN — Medication 2: at 06:51

## 2019-12-08 RX ADMIN — Medication 2 MILLIGRAM(S): at 22:14

## 2019-12-08 RX ADMIN — Medication 40 MILLIEQUIVALENT(S): at 17:37

## 2019-12-08 RX ADMIN — Medication 1 TABLET(S): at 11:23

## 2019-12-08 RX ADMIN — Medication 2 MILLIGRAM(S): at 07:45

## 2019-12-08 RX ADMIN — Medication 1 MILLIGRAM(S): at 11:23

## 2019-12-08 RX ADMIN — PREGABALIN 1000 MICROGRAM(S): 225 CAPSULE ORAL at 11:24

## 2019-12-08 NOTE — PROGRESS NOTE ADULT - PROBLEM SELECTOR PLAN 1
Presented with confusion and agitation to [facility]. Found to have lithium level 3.27. After fluid resuscitation and withholding medicine now WNL.  -s/p recs from poison control  -Renal u/s w/o evidence of lithium toxicity  -CTH w/o acute change

## 2019-12-08 NOTE — PROGRESS NOTE ADULT - ASSESSMENT
Pt is a 55 y/o F with hx of bipolar disorder type II, asthma, diabetes on insulin and metformin, HLD presenting with AMS possibly 2/2 to lithium toxicity. Pt admitted to MICU for possible intubation for airway protection, further work up and management.    Neuro/psych  #Toxic metabolic encephalopathy 2/2 to possible lithium toxicity  - Pt presented with confusion and agitation to [facility]. Found to have lithium level 3.27.   - Pt s/p 3 L NS, ketamine, ativan, haldol in [facility]   - Pt arrived to [facility] alert, awake, agitated. Pt given versed, haldol.  - Poison control called - recommended fluids and monitoring.  - Lithium level improved to 2.32, resolved to wnl at .97 - poison control notified   - continues to be incredibly agitated necessitating precedex with levophed for pressures support  - Psychiatry consulted; f//u recs     Renal   #Hyponatremia  - Pt initially w/ Na of 124. Overcorrected with 3 L NS to 134. Repeat here at 139  - Possible 2/2 to SIADH from chronic lithium use  - Due to overcorrection - started on D5W.  - currently at 141    ID  #Sepsis  - Pt w/ wbc of 13.68, Tachycardiac to 120s, LLL opacity on CXR cocerning for PNA.  - currently resolved, abx discontinued     CV  #Hemodynamics  - Pt became hypotensive to 60s/40s - pt was given 1 L NS w/o much response   - Pt started on levo gtt, continue to monitor       Endocrine  #DM  - lantus 10 units QHS  - ISS    Psych  #Bipolar type II  - pt on lithium, abilify,   - psych consult as above       F: D5W  E: replete prn  N: NPO  DVT prophylaxis: lovenox  Dispo: MICU Pt is a 55 y/o F with hx of bipolar disorder type II, asthma, diabetes on insulin and metformin, HLD presenting with AMS possibly 2/2 to lithium toxicity. Pt admitted to MICU for possible intubation for airway protection, further work up and management. Lithium level wnl, pt no longer agitated. Stable for transfer to [facility].     Neuro/psych  #Toxic metabolic encephalopathy 2/2 to possible lithium toxicity  - Pt presented with confusion and agitation to [facility]. Found to have lithium level 3.27.   - Pt s/p 3 L NS, ketamine, ativan, haldol in [facility]   - Pt arrived to [facility] alert, awake, agitated. Pt given versed, haldol.  - Poison control called - recommended fluids and monitoring.  - Lithium level improved to 2.32, resolved to wnl at .97 - poison control notified , no further recs  - US kidney negative for acute injury 2/2 lithium toxicity   - s/p pecedex for agitation, required levophed for pressures support  - weaned off precedex and levophed  - start Klonopin 2mg q12 and Seroquel 25mg qd  - Psychiatry consulted, unable to assess pt overnight as pt sedated; will re-evaluate   - f/u psych recs     Renal   #Hyponatremia  - Pt initially w/ Na of 124. Overcorrected with 3 L NS to 134. Repeat here at 139  - Possible 2/2 to SIADH from chronic lithium use  - s/p D5W, tolerating PO   - currently at 142  - continue to monitor BMP     ID  #Sepsis 2/2 agitation and lithium toxicity  - on presentation pt meeting 2/4 SIRS criteria w/ wbc of 13.68, Tachycardiac to 120s, LLL opacity on CXR concerning for PNA.   - No cough, afebrile, leukocytosis resolved most likely reactive etiology   - Azithromycin and CTX d/c'ed  - blood cx NGTD  - legionella negative     Pulmonary  #Asthma  Pt w hx of asthma, no inhalers at home  -MARIE, no wheezing on PE     CV  #Hemodynamics  - Pt became hypotensive to 60s/40s - pt was given 1 L NS w/o much response   - Pt started on levo gtt, weaned off levophed and precedex    #HLD  On home simvastatin 20mg qd  -c/w simvastatin 20mg qd     Endocrine  #DM  On home metformin 1000mg q12 and Latuda 60mg qhs  FS this AM elevated to the 300s   - increased lantus to 20 units QHS and added 6U pre-meals   - c/w ISS    Psych  #Bipolar type II  - pt on lithium 450mg qd, abilify 30mg qd  - hold off on home meds, pending psych recs   - psych consulted, appreciate recs       F: none   E: replete prn  N: DASH/TLC diet   DVT prophylaxis: lovenox  Dispo: MICU

## 2019-12-08 NOTE — CHART NOTE - NSCHARTNOTEFT_GEN_A_CORE
Attempted to evaluate patient however patient is lethargic, difficult to arouse, on precedex drip. Patient to be seen in AM as plan is to wean patient off precedex tomorrow as per primary team.    Tj Rosas DO

## 2019-12-08 NOTE — PROGRESS NOTE ADULT - PROBLEM SELECTOR PLAN 6
Apparent plaques on elbows and scalp  -petroleum jelly BID Apparent plaques on elbows and scalp  -petroleum jelly BID  -determine if topical steroids appropriate for scalp in AM

## 2019-12-08 NOTE — DIETITIAN INITIAL EVALUATION ADULT. - ENERGY NEEDS
Height: 5'5" Weight: 190lbs, IBW 125lbs+/-10%, %%, BMI 31.6  IBW used for calculations as pt >120% of IBW   Nutrient needs based on [facility] standards of care for maintenance in adults.

## 2019-12-08 NOTE — PROGRESS NOTE ADULT - PROBLEM SELECTOR PLAN 3
home metformin 1000mg q12 and Latuda 60mg qhs  - c/w lantus 20 units QHS and 6U pre-meals   - c/w ISS  - reeval in AM

## 2019-12-08 NOTE — PROGRESS NOTE ADULT - SUBJECTIVE AND OBJECTIVE BOX
Transfer Note 7E->[facility]    Hospital Course:  Pt is a 55 y/o F withbipolar disorder type II, asthma, diabetes, and HLD who presented to [facility] from [facility] with AMS (exhibiting confusion and agitation, shaking uncontrollably) 2/2 to lithium toxicity. Unclear mental status baseline although pt with low intellectual functioning. On presentation pt was afebrile, tachycardic () with leukocytosis (WBC 13.68) and lithium level of 3.27. CTH neg, CXR with LLL opacity (intial concern for infection but ABx dc'ed), Utox positive for benzo (after receiving benzos in the ED). Received haldol, ativan, ketamine and 3L NS bolus in the ED. On arrival to [facility] pt was alert, awake, and agitated. Pt admitted to MICU for close monitoring and low threshold for intubation. Received versed and haldol, however pt became very agitated and required precedex suquently requiring levophed for pressure support. Poison control recommended fluids and monitoring. S/p D5W lithium level downtrended to 0.97. Pt was found to be hyponatremic Na 124 possibly 2/2 SIADH from chronic lithium use. Na overcorrected to 134 s/p 3L NS with a peak of 142. Pt was started on Klonopin and Seroquel to allow for Wean off of precedex and levophed. No longer agitated. Psych was consulted. Pt stable for transfer to [facility].       SUBJECTIVE / INTERVAL HPI: Patient seen and examined at bedside. No complaints except for itchiness at her elbows and head. Patient denies headache, fever, chills, chest pain, SOB, abdominal pain, Nausea/Vomiting, or numbness/tingling.      .  VITAL SIGNS:  T(C): 37.2 (12-08-19 @ 10:00), Max: 37.2 (12-08-19 @ 10:00)  T(F): 98.9 (12-08-19 @ 10:00), Max: 98.9 (12-08-19 @ 10:00)  HR: 96 (12-08-19 @ 17:00) (68 - 100)  BP: 121/62 (12-08-19 @ 17:00) (86/48 - 138/73)  BP(mean): 86 (12-08-19 @ 17:00) (64 - 111)  RR: 19 (12-08-19 @ 17:00) (16 - 26)  SpO2: 95% (12-08-19 @ 17:00) (92% - 99%)  Wt(kg): --    PHYSICAL EXAM:    Constitutional: WDWN resting comfortably in bed; NAD  Head: NC/AT  Eyes: EOMI, anicteric sclera  ENT: no nasal discharge  Neck: supple  Respiratory: CTA b/l (anterior), no increased work of breathing  Cardiac: Holosystolic murmur, +S2, regular rate  Gastrointestinal: soft, NT/ND; no rebound or guarding; +BS  : Vincent in palce draining clear urine  Extremities: WWP, no peripheral edema, 2+ pulses Radial and DP  Musculoskeletal: NROM x4  Dermatologic: Silver plaque over salmon pase on elbows and scalp  Neurologic: Alert and oriented x2 (Person, [facility] the sick" when given options [facility], 2019 (not month)), no focal deficits appreciated  Psychiatric: affect and characteristics of appearance, verbalizations, behaviors are appropriate      12-07-19 @ 07:01  -  12-08-19 @ 07:00  --------------------------------------------------------  IN: 2330.4 mL / OUT: 3700 mL / NET: -1369.6 mL    12-08-19 @ 07:01  -  12-08-19 @ 18:03  --------------------------------------------------------  IN: 2 mL / OUT: 1130 mL / NET: -1128 mL        MEDICATIONS:  MEDICATIONS  (STANDING):  aspirin enteric coated 81 milliGRAM(s) Oral daily  chlorhexidine 2% Cloths 1 Application(s) Topical <User Schedule>  clonazePAM  Tablet 2 milliGRAM(s) Oral every 12 hours  cyanocobalamin 1000 MICROGram(s) Oral daily  dextrose 5%. 1000 milliLiter(s) (50 mL/Hr) IV Continuous <Continuous>  dextrose 50% Injectable 12.5 Gram(s) IV Push once  dextrose 50% Injectable 25 Gram(s) IV Push once  dextrose 50% Injectable 25 Gram(s) IV Push once  enoxaparin Injectable 40 milliGRAM(s) SubCutaneous every 24 hours  folic acid 1 milliGRAM(s) Oral daily  influenza   Vaccine 0.5 milliLiter(s) IntraMuscular once  insulin glargine Injectable (LANTUS) 20 Unit(s) SubCutaneous at bedtime  insulin lispro (HumaLOG) corrective regimen sliding scale   SubCutaneous Before meals and at bedtime  insulin lispro Injectable (HumaLOG) 6 Unit(s) SubCutaneous three times a day before meals  multivitamin 1 Tablet(s) Oral daily  QUEtiapine 25 milliGRAM(s) Oral daily  simvastatin 20 milliGRAM(s) Oral at bedtime    MEDICATIONS  (PRN):  dextrose 40% Gel 15 Gram(s) Oral once PRN Blood Glucose LESS THAN 70 milliGRAM(s)/deciliter  glucagon  Injectable 1 milliGRAM(s) IntraMuscular once PRN Glucose LESS THAN 70 milligrams/deciliter  haloperidol    Injectable 5 milliGRAM(s) IV Push every 6 hours PRN Agitation      ALLERGIES:  Allergies    Allergy Status Unknown    Intolerances        Pertinent LABS, RADIOLOGY, MICROBIOLOGY, and ADDITIONAL TESTS reviewed:   .  LABS:                         10.8   8.94  )-----------( 254      ( 08 Dec 2019 06:36 )             34.6     12-08    142  |  114<H>  |  4<L>  ----------------------------<  167<H>  3.2<L>   |  22  |  0.56    Ca    8.3<L>      08 Dec 2019 06:36  Phos  2.4     12-08  Mg     1.6     12-08    TPro  5.8<L>  /  Alb  3.5  /  TBili  0.4  /  DBili  x   /  AST  29  /  ALT  16  /  AlkPhos  151<H>  12-07    PT/INR - ( 07 Dec 2019 05:42 )   PT: 12.7 sec;   INR: 1.12          PTT - ( 07 Dec 2019 05:42 )  PTT:27.4 sec              RADIOLOGY, EKG & ADDITIONAL TESTS:   No New Imaging Transfer Note 7E->[facility]    Hospital Course:  Pt is a 53 y/o F withbipolar disorder type II, asthma, diabetes, and HLD who presented to [facility] from [facility] with AMS (exhibiting confusion and agitation, shaking uncontrollably) 2/2 to lithium toxicity. Unclear mental status baseline although pt with low intellectual functioning. On presentation pt was afebrile, tachycardic () with leukocytosis (WBC 13.68) and lithium level of 3.27. CTH neg, CXR with LLL opacity (intial concern for infection but ABx dc'ed), Utox positive for benzo (after receiving benzos in the ED). Received haldol, ativan, ketamine and 3L NS bolus in the ED. On arrival to [facility] pt was alert, awake, and agitated. Pt admitted to MICU for close monitoring and low threshold for intubation. Received versed and haldol, however pt became very agitated and required precedex suquently requiring levophed for pressure support. Poison control recommended fluids and monitoring. S/p D5W lithium level downtrended to 0.97. Pt was found to be hyponatremic Na 124 possibly 2/2 SIADH from chronic lithium use. Na overcorrected to 137 over 24 hours s/p 3L NS now at 142. Pt was started on Klonopin and Seroquel to allow for Wean off of precedex and levophed. No longer agitated. Psych was consulted. Pt stable for transfer to [facility].       SUBJECTIVE / INTERVAL HPI: Patient seen and examined at bedside. No complaints except for itchiness at her elbows and head. Patient denies headache, fever, chills, chest pain, SOB, abdominal pain, Nausea/Vomiting, or numbness/tingling.      .  VITAL SIGNS:  T(C): 37.2 (12-08-19 @ 10:00), Max: 37.2 (12-08-19 @ 10:00)  T(F): 98.9 (12-08-19 @ 10:00), Max: 98.9 (12-08-19 @ 10:00)  HR: 96 (12-08-19 @ 17:00) (68 - 100)  BP: 121/62 (12-08-19 @ 17:00) (86/48 - 138/73)  BP(mean): 86 (12-08-19 @ 17:00) (64 - 111)  RR: 19 (12-08-19 @ 17:00) (16 - 26)  SpO2: 95% (12-08-19 @ 17:00) (92% - 99%)  Wt(kg): --    PHYSICAL EXAM:    Constitutional: WDWN resting comfortably in bed; NAD  Head: NC/AT  Eyes: EOMI, anicteric sclera  ENT: no nasal discharge  Neck: supple  Respiratory: CTA b/l (anterior), no increased work of breathing  Cardiac: Holosystolic murmur, +S2, regular rate  Gastrointestinal: soft, NT/ND; no rebound or guarding; +BS  : Vincent in palce draining clear urine  Extremities: WWP, no peripheral edema, 2+ pulses Radial and DP  Musculoskeletal: NROM x4  Dermatologic: Silver plaque over salmon base on elbows and scalp  Neurologic: Alert and oriented x2 (Person, [facility] "facility for the sick" when given options [facility], 2019 (not month)), no focal deficits appreciated  Psychiatric: affect and characteristics of appearance, verbalizations, behaviors are appropriate      12-07-19 @ 07:01  -  12-08-19 @ 07:00  --------------------------------------------------------  IN: 2330.4 mL / OUT: 3700 mL / NET: -1369.6 mL    12-08-19 @ 07:01  -  12-08-19 @ 18:03  --------------------------------------------------------  IN: 2 mL / OUT: 1130 mL / NET: -1128 mL        MEDICATIONS:  MEDICATIONS  (STANDING):  aspirin enteric coated 81 milliGRAM(s) Oral daily  chlorhexidine 2% Cloths 1 Application(s) Topical <User Schedule>  clonazePAM  Tablet 2 milliGRAM(s) Oral every 12 hours  cyanocobalamin 1000 MICROGram(s) Oral daily  dextrose 5%. 1000 milliLiter(s) (50 mL/Hr) IV Continuous <Continuous>  dextrose 50% Injectable 12.5 Gram(s) IV Push once  dextrose 50% Injectable 25 Gram(s) IV Push once  dextrose 50% Injectable 25 Gram(s) IV Push once  enoxaparin Injectable 40 milliGRAM(s) SubCutaneous every 24 hours  folic acid 1 milliGRAM(s) Oral daily  influenza   Vaccine 0.5 milliLiter(s) IntraMuscular once  insulin glargine Injectable (LANTUS) 20 Unit(s) SubCutaneous at bedtime  insulin lispro (HumaLOG) corrective regimen sliding scale   SubCutaneous Before meals and at bedtime  insulin lispro Injectable (HumaLOG) 6 Unit(s) SubCutaneous three times a day before meals  multivitamin 1 Tablet(s) Oral daily  QUEtiapine 25 milliGRAM(s) Oral daily  simvastatin 20 milliGRAM(s) Oral at bedtime    MEDICATIONS  (PRN):  dextrose 40% Gel 15 Gram(s) Oral once PRN Blood Glucose LESS THAN 70 milliGRAM(s)/deciliter  glucagon  Injectable 1 milliGRAM(s) IntraMuscular once PRN Glucose LESS THAN 70 milligrams/deciliter  haloperidol    Injectable 5 milliGRAM(s) IV Push every 6 hours PRN Agitation      ALLERGIES:  Allergies    Allergy Status Unknown    Intolerances        Pertinent LABS, RADIOLOGY, MICROBIOLOGY, and ADDITIONAL TESTS reviewed:   .  LABS:                         10.8   8.94  )-----------( 254      ( 08 Dec 2019 06:36 )             34.6     12-08    142  |  114<H>  |  4<L>  ----------------------------<  167<H>  3.2<L>   |  22  |  0.56    Ca    8.3<L>      08 Dec 2019 06:36  Phos  2.4     12-08  Mg     1.6     12-08    TPro  5.8<L>  /  Alb  3.5  /  TBili  0.4  /  DBili  x   /  AST  29  /  ALT  16  /  AlkPhos  151<H>  12-07    PT/INR - ( 07 Dec 2019 05:42 )   PT: 12.7 sec;   INR: 1.12          PTT - ( 07 Dec 2019 05:42 )  PTT:27.4 sec              RADIOLOGY, EKG & ADDITIONAL TESTS:   No New Imaging

## 2019-12-08 NOTE — PROGRESS NOTE ADULT - PROBLEM SELECTOR PLAN 5
Pt w hx of asthma, no inhalers at home  -MARIE, no wheezing on PE   -albuterol q6 PRN Pt w hx of asthma  -MARIE, no wheezing on PE   -albuterol q6 PRN

## 2019-12-08 NOTE — PROGRESS NOTE ADULT - PROBLEM SELECTOR PLAN 4
On admission, w/ Na of 124. Overcorrected with 3 L NS to 134. Then up to 142. Likely 2/2 to SIADH from chronic lithium use. Serum Osm 298, Urine Osm 162, Urine Na 62  - continue to monitor BMP   -currently asymptomatic On admission, w/ Na of 124. Overcorrected with 3 L NS to 134. Then up to 142. Likely 2/2 to SIADH from chronic lithium use. Serum Osm 298, Urine Osm 162, Urine Na 62.  -continue to monitor BMP   -currently asymptomatic  - As patient Na never below 120, lower risk of osmotic demyelination- monitor mental status

## 2019-12-08 NOTE — PROGRESS NOTE ADULT - PROBLEM SELECTOR PLAN 2
pt on lithium 450mg qd, abilify 30mg qd. s/p pecedex for agitation, required levophed for pressures support  - hold off on home meds, pending psych recs   - c/w Klonopin 2mg q12 and Seroquel 25mg qd  - Psychiatry consulted, unable to assess pt overnight as pt sedated; will re-evaluate

## 2019-12-08 NOTE — PROGRESS NOTE ADULT - ASSESSMENT
Pt is a 55 y/o woman with bipolar disorder type who presented with AMS likely 2/2 to lithium toxicity. Course complicated by over corrected hyponatremia.

## 2019-12-08 NOTE — DIETITIAN INITIAL EVALUATION ADULT. - OTHER INFO
Pt is a 53 y/o F with hx of bipolar disorder type II, asthma, diabetes on insulin and metformin, HLD presenting with AMS possibly 2/2 to lithium toxicity. Pt had been very agitated necessitating precedex. Was also hypotensive w/o response to 1L NS and was started levo for pressure support. Pt seen in room, resting in bed, eating lunch w/ 1:1 aid in room. Was weaned of precedex this am, now more alert and sensically responsive to questions. MAP at 100 at time of assessment- not requiring pressor support at this time. Currently on a DASH/TLC, CSTCHO diet and tolerating PO. Consumed 100% of pudding and mashed potatoes +1 bite of chicken. Denies GI distress, no N/V, has yet to have a BM this admission. Discussed purpose of current diet orders relative to heart health and BG management. Pt somewhat receptive- Noted A1C 9.4%. Reports preparing her own meals at home, stated vegetable soup and scrambled eggs as examples. States UBW is 165-180lbs, admitted wt is 190lbs- question accuracy of reported weight/may have fluctuated from fluid resuscitation, will trend.  Skin: intact pressure wise. C/w DASH/TLC, CSTCHO, and micronutrient supplementation. RD to follow.

## 2019-12-08 NOTE — PROGRESS NOTE ADULT - SUBJECTIVE AND OBJECTIVE BOX
INCOMPLETE  OVERNIGHT EVENTS: Patient admitted overnight. Increasingly agitated and given Haldol 2 mg x2 and versed 2 mg x2 with resolution in agitation.     SUBJECTIVE / INTERVAL HPI: Patient seen and examined at bedside. Incredibly agitated and non sensical. Refuses to participate with majority of exam.       PHYSICAL EXAM:    General: WDWN  HEENT: NC/AT; PERRL, anicteric sclera; MMM  Neck: supple  Cardiovascular: Refused  Respiratory: Refused  Gastrointestinal: Refused  Extremities: WWP; no edema, clubbing or cyanosis  Vascular: Refused  Neurological: AAOx1; unable to assess secondary to patient non compliance   Psychiatric: agitated and combative mood and affect   Dermatologic: no appreciable wounds or damage to the skin    VITAL SIGNS:  Vital Signs Last 24 Hrs  T(C): 36.6 (07 Dec 2019 18:16), Max: 37.5 (06 Dec 2019 21:08)  T(F): 97.9 (07 Dec 2019 18:16), Max: 99.5 (06 Dec 2019 21:08)  HR: 96 (07 Dec 2019 15:00) (72 - 112)  BP: 119/55 (07 Dec 2019 15:00) (65/38 - 939/42)  BP(mean): 78 (07 Dec 2019 15:00) (50 - 93)  RR: 23 (07 Dec 2019 15:00) (10 - 23)  SpO2: 99% (07 Dec 2019 15:00) (83% - 100%)      MEDICATIONS:  MEDICATIONS  (STANDING):  aspirin enteric coated 81 milliGRAM(s) Oral daily  chlorhexidine 2% Cloths 1 Application(s) Topical <User Schedule>  cyanocobalamin 1000 MICROGram(s) Oral daily  dexMEDEtomidine Infusion 0.4 MICROgram(s)/kG/Hr (8.62 mL/Hr) IV Continuous <Continuous>  dextrose 5%. 1000 milliLiter(s) (50 mL/Hr) IV Continuous <Continuous>  dextrose 50% Injectable 12.5 Gram(s) IV Push once  dextrose 50% Injectable 25 Gram(s) IV Push once  dextrose 50% Injectable 25 Gram(s) IV Push once  enoxaparin Injectable 40 milliGRAM(s) SubCutaneous every 24 hours  folic acid 1 milliGRAM(s) Oral daily  insulin glargine Injectable (LANTUS) 10 Unit(s) SubCutaneous at bedtime  insulin lispro (HumaLOG) corrective regimen sliding scale   SubCutaneous Before meals and at bedtime  multivitamin 1 Tablet(s) Oral daily  norepinephrine Infusion 0.05 MICROgram(s)/kG/Min (8.081 mL/Hr) IV Continuous <Continuous>  simvastatin 20 milliGRAM(s) Oral at bedtime    MEDICATIONS  (PRN):  dextrose 40% Gel 15 Gram(s) Oral once PRN Blood Glucose LESS THAN 70 milliGRAM(s)/deciliter  glucagon  Injectable 1 milliGRAM(s) IntraMuscular once PRN Glucose LESS THAN 70 milligrams/deciliter  haloperidol    Injectable 5 milliGRAM(s) IV Push every 6 hours PRN Agitation      ALLERGIES:  Allergies    Allergy Status Unknown    Intolerances        LABS:                        11.0   10.75 )-----------( 289      ( 07 Dec 2019 05:42 )             35.4     12-07    137  |  110<H>  |  4<L>  ----------------------------<  323<H>  4.0   |  20<L>  |  0.66    Ca    8.6      07 Dec 2019 05:42  Phos  2.1     12-07  Mg     1.8     12-07    TPro  5.8<L>  /  Alb  3.5  /  TBili  0.4  /  DBili  x   /  AST  29  /  ALT  16  /  AlkPhos  151<H>  12-07    PT/INR - ( 07 Dec 2019 05:42 )   PT: 12.7 sec;   INR: 1.12          PTT - ( 07 Dec 2019 05:42 )  PTT:27.4 sec  Urinalysis Basic - ( 06 Dec 2019 10:38 )    Color: Yellow / Appearance: Clear / SG: <=1.005 / pH: x  Gluc: x / Ketone: Trace mg/dL  / Bili: NEGATIVE / Urobili: 0.2 E.U./dL   Blood: x / Protein: 30 mg/dL / Nitrite: NEGATIVE   Leuk Esterase: Moderate / RBC: 5-10 /HPF / WBC > 10 /HPF   Sq Epi: x / Non Sq Epi: Moderate /HPF / Bacteria: Many /HPF      CAPILLARY BLOOD GLUCOSE      POCT Blood Glucose.: 190 mg/dL (07 Dec 2019 17:20)      RADIOLOGY & ADDITIONAL TESTS: Reviewed. Transfer note from  to [facility]   Hospital course  Pt is a 53 y/o F with hx of bipolar disorder type II, asthma, diabetes on insulin and metformin, HLD presenting to [facility] from [facility] with AMS (exhibiting confusion and agitation, shaking uncontrollably) possibly 2/2 to lithium toxicity. Unclear mental status baseline although pt with low intellectual functioning. On presentation pt was afebrile, tachycardic () with leukocytosis (WBC 13.68) and lithium level of 3.27. CTH neg, CXR with LLL opacity, Utox positive for benzo (after receiving benzos in the ED). Received haldol, ativan, ketamine and 3L NS bolus in the ED. On arrival to [facility] pt was alert, awake, and agitated. Pt admitted to MICU for close monitoring and low threshold for intubation. Received versed and haldol, however pt became very agitated and required precedex and levophed for pressure support. Poison control recommended fluids and monitoring. S/p D5W lithium level downtrended to 0.97. Pt was found to be hyponatremic Na 124 possibly 2/2 SIADH from chronic lithium use. Na overcorrected to 134 s/p 3L NS. Pt was started on Klonopin and Seroquel. Weaned off precedex and levophed. No longer agitated. Psych was consulted. Pt stable for transfer to [facility].     OVERNIGHT EVENTS: Was started on Klonopin and Seroquel. Weaned off Precedex and Levophed.     SUBJECTIVE / INTERVAL HPI: Patient seen and examined at bedside. Calm, has no complaints. Denies dizziness, headache, CP, SOB, N/V/F/C.       PHYSICAL EXAM:    General: WDWN  HEENT: NC/AT; PERRL, anicteric sclera; MMM  Neck: supple  Cardiovascular: Refused  Respiratory: Refused  Gastrointestinal: Refused  Extremities: WWP; no edema, clubbing or cyanosis  Vascular: Refused  Neurological: AAOx3; unable to assess secondary to patient non compliance   Psychiatric: calm   Dermatologic: no appreciable wounds or damage to the skin    VITAL SIGNS:  ICU Vital Signs Last 24 Hrs  T(C): 37.2 (08 Dec 2019 10:00), Max: 37.2 (08 Dec 2019 10:00)  T(F): 98.9 (08 Dec 2019 10:00), Max: 98.9 (08 Dec 2019 10:00)  HR: 96 (08 Dec 2019 16:00) (68 - 100)  BP: 124/68 (08 Dec 2019 16:00) (86/48 - 138/73)  BP(mean): 91 (08 Dec 2019 16:00) (64 - 111)  ABP: --  ABP(mean): --  RR: 20 (08 Dec 2019 16:00) (16 - 41)  SpO2: 97% (08 Dec 2019 16:00) (92% - 99%)    MEDICATIONS:  MEDICATIONS  (STANDING):  aspirin enteric coated 81 milliGRAM(s) Oral daily  chlorhexidine 2% Cloths 1 Application(s) Topical <User Schedule>  clonazePAM  Tablet 2 milliGRAM(s) Oral every 12 hours  cyanocobalamin 1000 MICROGram(s) Oral daily  dextrose 5%. 1000 milliLiter(s) (50 mL/Hr) IV Continuous <Continuous>  dextrose 50% Injectable 12.5 Gram(s) IV Push once  dextrose 50% Injectable 25 Gram(s) IV Push once  dextrose 50% Injectable 25 Gram(s) IV Push once  enoxaparin Injectable 40 milliGRAM(s) SubCutaneous every 24 hours  folic acid 1 milliGRAM(s) Oral daily  influenza   Vaccine 0.5 milliLiter(s) IntraMuscular once  insulin glargine Injectable (LANTUS) 20 Unit(s) SubCutaneous at bedtime  insulin lispro (HumaLOG) corrective regimen sliding scale   SubCutaneous Before meals and at bedtime  insulin lispro Injectable (HumaLOG) 6 Unit(s) SubCutaneous three times a day before meals  multivitamin 1 Tablet(s) Oral daily  potassium chloride    Tablet ER 40 milliEquivalent(s) Oral every 4 hours  QUEtiapine 25 milliGRAM(s) Oral daily  simvastatin 20 milliGRAM(s) Oral at bedtime    MEDICATIONS  (PRN):  dextrose 40% Gel 15 Gram(s) Oral once PRN Blood Glucose LESS THAN 70 milliGRAM(s)/deciliter  glucagon  Injectable 1 milliGRAM(s) IntraMuscular once PRN Glucose LESS THAN 70 milligrams/deciliter  haloperidol    Injectable 5 milliGRAM(s) IV Push every 6 hours PRN Agitation    ALLERGIES:  Allergies    Allergy Status Unknown    Intolerances    .  LABS:                         10.8   8.94  )-----------( 254      ( 08 Dec 2019 06:36 )             34.6     12-08    142  |  114<H>  |  4<L>  ----------------------------<  167<H>  3.2<L>   |  22  |  0.56    Ca    8.3<L>      08 Dec 2019 06:36  Phos  2.4     12-08  Mg     1.6     12-08    TPro  5.8<L>  /  Alb  3.5  /  TBili  0.4  /  DBili  x   /  AST  29  /  ALT  16  /  AlkPhos  151<H>  12-07    PT/INR - ( 07 Dec 2019 05:42 )   PT: 12.7 sec;   INR: 1.12          PTT - ( 07 Dec 2019 05:42 )  PTT:27.4 sec      RADIOLOGY, EKG & ADDITIONAL TESTS: Reviewed.   POCT Blood Glucose.: 190 mg/dL (07 Dec 2019 17:20)      RADIOLOGY & ADDITIONAL TESTS: Reviewed.

## 2019-12-09 DIAGNOSIS — F31.9 BIPOLAR DISORDER, UNSPECIFIED: ICD-10-CM

## 2019-12-09 DIAGNOSIS — F25.0 SCHIZOAFFECTIVE DISORDER, BIPOLAR TYPE: ICD-10-CM

## 2019-12-09 LAB
ANION GAP SERPL CALC-SCNC: 8 MMOL/L — SIGNIFICANT CHANGE UP (ref 5–17)
BLD GP AB SCN SERPL QL: NEGATIVE — SIGNIFICANT CHANGE UP
BUN SERPL-MCNC: 4 MG/DL — LOW (ref 7–23)
CALCIUM SERPL-MCNC: 9.5 MG/DL — SIGNIFICANT CHANGE UP (ref 8.4–10.5)
CHLORIDE SERPL-SCNC: 107 MMOL/L — SIGNIFICANT CHANGE UP (ref 96–108)
CO2 SERPL-SCNC: 24 MMOL/L — SIGNIFICANT CHANGE UP (ref 22–31)
CREAT SERPL-MCNC: 0.57 MG/DL — SIGNIFICANT CHANGE UP (ref 0.5–1.3)
GLUCOSE BLDC GLUCOMTR-MCNC: 146 MG/DL — HIGH (ref 70–99)
GLUCOSE BLDC GLUCOMTR-MCNC: 193 MG/DL — HIGH (ref 70–99)
GLUCOSE BLDC GLUCOMTR-MCNC: 248 MG/DL — HIGH (ref 70–99)
GLUCOSE BLDC GLUCOMTR-MCNC: 267 MG/DL — HIGH (ref 70–99)
GLUCOSE SERPL-MCNC: 162 MG/DL — HIGH (ref 70–99)
HCT VFR BLD CALC: 33.5 % — LOW (ref 34.5–45)
HGB BLD-MCNC: 10.6 G/DL — LOW (ref 11.5–15.5)
MAGNESIUM SERPL-MCNC: 1.5 MG/DL — LOW (ref 1.6–2.6)
MCHC RBC-ENTMCNC: 28.6 PG — SIGNIFICANT CHANGE UP (ref 27–34)
MCHC RBC-ENTMCNC: 31.6 GM/DL — LOW (ref 32–36)
MCV RBC AUTO: 90.5 FL — SIGNIFICANT CHANGE UP (ref 80–100)
NRBC # BLD: 0 /100 WBCS — SIGNIFICANT CHANGE UP (ref 0–0)
PHOSPHATE SERPL-MCNC: 3.1 MG/DL — SIGNIFICANT CHANGE UP (ref 2.5–4.5)
PLATELET # BLD AUTO: 284 K/UL — SIGNIFICANT CHANGE UP (ref 150–400)
POTASSIUM SERPL-MCNC: 4.7 MMOL/L — SIGNIFICANT CHANGE UP (ref 3.5–5.3)
POTASSIUM SERPL-SCNC: 4.7 MMOL/L — SIGNIFICANT CHANGE UP (ref 3.5–5.3)
RBC # BLD: 3.7 M/UL — LOW (ref 3.8–5.2)
RBC # FLD: 14.4 % — SIGNIFICANT CHANGE UP (ref 10.3–14.5)
RH IG SCN BLD-IMP: POSITIVE — SIGNIFICANT CHANGE UP
SODIUM SERPL-SCNC: 139 MMOL/L — SIGNIFICANT CHANGE UP (ref 135–145)
WBC # BLD: 9.09 K/UL — SIGNIFICANT CHANGE UP (ref 3.8–10.5)
WBC # FLD AUTO: 9.09 K/UL — SIGNIFICANT CHANGE UP (ref 3.8–10.5)

## 2019-12-09 PROCEDURE — 99223 1ST HOSP IP/OBS HIGH 75: CPT

## 2019-12-09 PROCEDURE — 99233 SBSQ HOSP IP/OBS HIGH 50: CPT | Mod: GC

## 2019-12-09 RX ORDER — CLONAZEPAM 1 MG
1 TABLET ORAL EVERY 12 HOURS
Refills: 0 | Status: DISCONTINUED | OUTPATIENT
Start: 2019-12-09 | End: 2019-12-10

## 2019-12-09 RX ORDER — LURASIDONE HYDROCHLORIDE 40 MG/1
60 TABLET ORAL DAILY
Refills: 0 | Status: DISCONTINUED | OUTPATIENT
Start: 2019-12-09 | End: 2019-12-10

## 2019-12-09 RX ORDER — DIVALPROEX SODIUM 500 MG/1
250 TABLET, DELAYED RELEASE ORAL
Refills: 0 | Status: DISCONTINUED | OUTPATIENT
Start: 2019-12-09 | End: 2019-12-10

## 2019-12-09 RX ORDER — MAGNESIUM SULFATE 500 MG/ML
2 VIAL (ML) INJECTION ONCE
Refills: 0 | Status: COMPLETED | OUTPATIENT
Start: 2019-12-09 | End: 2019-12-09

## 2019-12-09 RX ADMIN — DIVALPROEX SODIUM 250 MILLIGRAM(S): 500 TABLET, DELAYED RELEASE ORAL at 18:42

## 2019-12-09 RX ADMIN — Medication 6 UNIT(S): at 14:17

## 2019-12-09 RX ADMIN — Medication 1 TABLET(S): at 12:14

## 2019-12-09 RX ADMIN — HALOPERIDOL DECANOATE 5 MILLIGRAM(S): 100 INJECTION INTRAMUSCULAR at 03:53

## 2019-12-09 RX ADMIN — Medication 2: at 09:14

## 2019-12-09 RX ADMIN — Medication 1 MILLIGRAM(S): at 20:02

## 2019-12-09 RX ADMIN — Medication 50 GRAM(S): at 07:58

## 2019-12-09 RX ADMIN — SENNA PLUS 1 TABLET(S): 8.6 TABLET ORAL at 12:09

## 2019-12-09 RX ADMIN — Medication 6 UNIT(S): at 09:17

## 2019-12-09 RX ADMIN — SIMVASTATIN 20 MILLIGRAM(S): 20 TABLET, FILM COATED ORAL at 22:00

## 2019-12-09 RX ADMIN — Medication 6 UNIT(S): at 18:37

## 2019-12-09 RX ADMIN — LURASIDONE HYDROCHLORIDE 60 MILLIGRAM(S): 40 TABLET ORAL at 18:39

## 2019-12-09 RX ADMIN — PREGABALIN 1000 MICROGRAM(S): 225 CAPSULE ORAL at 12:09

## 2019-12-09 RX ADMIN — QUETIAPINE FUMARATE 25 MILLIGRAM(S): 200 TABLET, FILM COATED ORAL at 12:09

## 2019-12-09 RX ADMIN — Medication 1 MILLIGRAM(S): at 12:09

## 2019-12-09 RX ADMIN — Medication 4: at 22:06

## 2019-12-09 RX ADMIN — Medication 1 APPLICATION(S): at 22:00

## 2019-12-09 RX ADMIN — Medication 6: at 14:17

## 2019-12-09 RX ADMIN — Medication 81 MILLIGRAM(S): at 12:09

## 2019-12-09 RX ADMIN — Medication 2 MILLIGRAM(S): at 09:13

## 2019-12-09 RX ADMIN — INSULIN GLARGINE 20 UNIT(S): 100 INJECTION, SOLUTION SUBCUTANEOUS at 22:06

## 2019-12-09 NOTE — BEHAVIORAL HEALTH ASSESSMENT NOTE - ORIENTATION OTHER
when prompted pt knew building was hospital, but not name when prompted pt knew building was hospital, but not name, initially said 2006 but then corrected herself

## 2019-12-09 NOTE — PROGRESS NOTE ADULT - PROBLEM SELECTOR PLAN 6
Apparent plaques on elbows and scalp  -petroleum jelly BID  -determine if topical steroids appropriate for scalp in AM

## 2019-12-09 NOTE — BEHAVIORAL HEALTH ASSESSMENT NOTE - DIFFERENTIAL
Schizoaffective, bipolar type Bipolar disorder Pt identifies with Bipolar disorder, although prior chart from [facility] states Schizoaffective, bipolar type

## 2019-12-09 NOTE — BEHAVIORAL HEALTH ASSESSMENT NOTE - OTHER PAST PSYCHIATRIC HISTORY (INCLUDE DETAILS REGARDING ONSET, COURSE OF ILLNESS, INPATIENT/OUTPATIENT TREATMENT)
5-6 hospitalizations for bipolar, psych dx of Schizoaffective, bipolar type per Libby @ [facility]

## 2019-12-09 NOTE — BEHAVIORAL HEALTH ASSESSMENT NOTE - RISK ASSESSMENT
Low Acute Suicide Risk The patient presents with lithium toxicity which was possibly precipitated by incorrect dosing schedule. Factors that seem to have predisposed her to lithium toxicity are low intellectual status at baseline, poor social support, no family support, and hx of schizoaffective, bipolar type. Protective factors include hx of treatment responsiveness and housing stability.

## 2019-12-09 NOTE — BEHAVIORAL HEALTH ASSESSMENT NOTE - NSBHADMITCOUNSEL_PSY_A_CORE
prognosis/other.../risks and benefits of treatment options/importance of adherence to chosen treatment/client/family/caregiver education/diagnostic results/impressions and/or recommended studies/instructions for management, treatment and follow up/risk factor reduction

## 2019-12-09 NOTE — PROGRESS NOTE ADULT - PROBLEM SELECTOR PLAN 4
On admission, w/ Na of 124. Overcorrected with 3 L NS to 134. Then up to 142. Likely 2/2 to SIADH from chronic lithium use. Serum Osm 298, Urine Osm 162, Urine Na 62.  -continue to monitor BMP   -currently asymptomatic  - As patient Na never below 120, lower risk of osmotic demyelination- monitor mental status Resolved. Presented with confusion and agitation to [facility]. Found to have lithium level 3.27. After fluid resuscitation and withholding medicine now WNL.  -s/p recs from poison control  -Renal u/s: w/o evidence of lithium toxicity  -CTH w/o acute change  -Li level downtrended to within normal limits

## 2019-12-09 NOTE — BEHAVIORAL HEALTH ASSESSMENT NOTE - NSBHCHARTREVIEWINVESTIGATE_PSY_A_CORE FT
Urinalysis Basic - ( 06 Dec 2019 10:38 )    	Color: Yellow / Appearance: Clear / SG: <=1.005 / pH: x  	Gluc: x / Ketone: Trace mg/dL  / Bili: NEGATIVE / Urobili: 0.2 E.U./dL   	Blood: x / Protein: 30 mg/dL / Nitrite: NEGATIVE   	Leuk Esterase: Moderate / RBC: 5-10 /HPF / WBC > 10 /HPF   	Sq Epi: x / Non Sq Epi: Moderate /HPF / Bacteria: Many /HPF

## 2019-12-09 NOTE — BEHAVIORAL HEALTH ASSESSMENT NOTE - NSBHADMITCOUNSELOTHER_PSY_A_CORE FT
Counseled pt on lithium toxicity, discussed her psychiatric history, her supports and family connections, and assessed for delirium.

## 2019-12-09 NOTE — BEHAVIORAL HEALTH ASSESSMENT NOTE - NSBHCHARTREVIEWLAB_PSY_A_CORE FT
Complete Blood Count in AM (12.09.19 @ 05:45)    Nucleated RBC: 0 /100 WBCs    WBC Count: 9.09 K/uL    RBC Count: 3.70 M/uL    Hemoglobin: 10.6 g/dL    Hematocrit: 33.5 %    Mean Cell Volume: 90.5 fl    Mean Cell Hemoglobin: 28.6 pg    Mean Cell Hemoglobin Conc: 31.6 gm/dL    Red Cell Distrib Width: 14.4 %    Platelet Count - Automated: 284 K/uL    Comprehensive Metabolic Panel (12.07.19 @ 05:42)    Sodium, Serum: 137 mmol/L    Potassium, Serum: 4.0 mmol/L    Chloride, Serum: 110 mmol/L    Carbon Dioxide, Serum: 20 mmol/L    Anion Gap, Serum: 7 mmol/L    Blood Urea Nitrogen, Serum: 4 mg/dL    Creatinine, Serum: 0.66 mg/dL    Glucose, Serum: 323 mg/dL    Calcium, Total Serum: 8.6 mg/dL    Protein Total, Serum: 5.8 g/dL    Albumin, Serum: 3.5 g/dL    Bilirubin Total, Serum: 0.4 mg/dL    Alkaline Phosphatase, Serum: 151 U/L    Aspartate Aminotransferase (AST/SGOT): 29 U/L    Alanine Aminotransferase (ALT/SGPT): 16 U/L    eGFR if Non : 100

## 2019-12-09 NOTE — PROGRESS NOTE ADULT - PROBLEM SELECTOR PLAN 3
home metformin 1000mg q12 and Latuda 60mg qhs  - c/w lantus 20 units QHS and 6U pre-meals   - c/w ISS  - reeval in AM On admission, w/ Na of 124. Overcorrected with 3 L NS to 134. Then up to 142. Likely 2/2 to SIADH from chronic lithium use. Serum Osm 298, Urine Osm 162, Urine Na 62.  -continue to monitor BMP   -currently asymptomatic  - As patient Na never below 120, lower risk of osmotic demyelination- monitor mental status  - Na currently stable at 139

## 2019-12-09 NOTE — BEHAVIORAL HEALTH ASSESSMENT NOTE - HPI (INCLUDE ILLNESS QUALITY, SEVERITY, DURATION, TIMING, CONTEXT, MODIFYING FACTORS, ASSOCIATED SIGNS AND SYMPTOMS)
Per H&P:    "Pt is a 55 y/o F with hx of bipolar disorder type II, asthma, diabetes on insulin and metformin, HLD presented to [facility] from [facility] for AMS - exhibiting confusion and agitation, shaking uncontrollably. Unclear baseline although pt with low intellectual functioning. Difficult to obtain a hx from pt due to pt was given sedatives and underlying mental disorder. Per [facility], pt was uncontrollably shaking during the interview, pt was fully conscious.    In [facility], vitals Tmax 100.2, /81, , RR 16, O2 sat 97% on RA. Labs significant for wbc 13.68, Na 124, K 3.2, Cr 1.33, lithium 3.27, glucose 289. ABG (most likely vbg) w/ pH 7.26, pCO2 51, U/A positive although moderate epithelial cells present. CT Head negative, CXR w/ LLL opacity. Pt was given ativan, haldol and ketamine and 3L NS."    Pt seen and examined this morning at bedside. Pt lying in bed asleep wearing hospital gown. Pt aroused and evaluated by writer, pt states she is "feeling fine". Describes mood as "good". Recalls coming into hospital because she was shaking. Pt endorses psych hx of bipolar disorder, recalls being hospitalized at least once. States she takes Li two pills in morning. Recalls 1 month ago she fell and could not get up, and had to be brought to hospital but couldn't recall name of hospital. Pt states her parents and sister are dead, cannot recall if they had psychiatric illnesses. Pt states she grew up in [facility]. Denies any suicidality stating "life is alba". No AH/VH.     Per PCA at bedside, pt was agitated last night and received emergency PRN meds.    Per Libby @ [facility] [address] - [phone]. [phone] - Pt moved into [facility] transitional housing on 11/19/19, previously domiciled in [facility] ( - [place] - [phone]). During time at [facility], pt would argue with fellow residents but deny arguments to staff, no hx of violence. Pt has psych dx of Schizoaffective, Bipolar type with reported mood fluctuations in Sept. during which Li was started. Per previous chart, 5-6 reports of hospitalizations for bipolar disorder each lasting about 1 month but unknown at which locations. No known legal hx. Medical hx of asthma, normocytic anemia, DMII, and psoriasis of scalp. Pt's baseline is vocal, tendency for childish persona, responsive most of time, some intellectual impairment. Medications are Li  mg 1x in am and 2x at bedtime, Trazodone 50 mg, Abilify 30 mg, Latuda 60 mg at bedtime. Insulin 20 units in am, Metformin 1000 mg bid. Omeprazole 20 mg bid. Simvastatin 20 mg at bedtime. ASN 81 mg qd, Vit D 50,000 weekly, Vit B12/Folate.

## 2019-12-09 NOTE — PROGRESS NOTE ADULT - PROBLEM SELECTOR PLAN 2
pt on lithium 450mg qd, abilify 30mg qd. s/p pecedex for agitation, required levophed for pressures support  - hold off on home meds, pending psych recs   - c/w Klonopin 2mg q12 and Seroquel 25mg qd  - Psychiatry consulted, unable to assess pt overnight as pt sedated; will re-evaluate home metformin 1000mg q12 and Latuda 60mg qhs  - c/w lantus 20 units QHS and 6U pre-meals   - c/w ISS home metformin 1000mg q12 and Latuda 60mg qhs  - c/w lantus 20 units QHS and 6U pre-meals   - c/w ISS  ** Patient refused last nights dose of lantus, will observe tomorrow on basal dose.

## 2019-12-09 NOTE — BEHAVIORAL HEALTH ASSESSMENT NOTE - PROBLEM SELECTOR PLAN 2
- psych dx per [employer] [facility] clinical director  - restart Abilify 30 mg, Latuda 60 mg, and Trazodone 50 mg qd at bedtime  - plan for possible IP psych admission once medically stabilized  - PRN haldol, ativan for agitation - start Depakote 250 mg bid for mood stabilization, titrate to 500 mg bid as tolerated  - hold Abilify in setting of poly-antipsychotics  - c/w Latuda 60 mg qd with meal

## 2019-12-09 NOTE — BEHAVIORAL HEALTH ASSESSMENT NOTE - NSBHCHARTREVIEWVS_PSY_A_CORE FT
Vital Signs Last 24 Hrs  T(C): 36.9 (09 Dec 2019 05:17), Max: 36.9 (08 Dec 2019 18:12)  T(F): 98.5 (09 Dec 2019 05:17), Max: 98.5 (09 Dec 2019 05:17)  HR: 98 (09 Dec 2019 05:17) (92 - 105)  BP: 119/73 (09 Dec 2019 05:17) (117/63 - 138/73)  BP(mean): 86 (08 Dec 2019 19:00) (86 - 111)  RR: 16 (09 Dec 2019 05:17) (16 - 26)  SpO2: 97% (09 Dec 2019 05:17) (94% - 99%)

## 2019-12-09 NOTE — PROGRESS NOTE ADULT - PROBLEM SELECTOR PLAN 1
Presented with confusion and agitation to [facility]. Found to have lithium level 3.27. After fluid resuscitation and withholding medicine now WNL.  -s/p recs from poison control  -Renal u/s w/o evidence of lithium toxicity  -CTH w/o acute change pt on lithium 450mg qd, abilify 30mg qd. s/p pecedex for agitation, required levophed for pressures support  - d/c Klonopin 2mg q12 and Seroquel 25mg qd    Plan:  - Psychiatry consulted  - Will hold Lithium  - Will hold Abilify in setting of poly-antipsychotics  - Start Depakote 250 mg bid for mood stabilization, and titrate to 500 mg bid as tolerated  - C/w Latuda 60 mg qd with meal pt on lithium 450mg qd, abilify 30mg qd. s/p pecedex for agitation, required levophed for pressures support  - d/c Klonopin 2mg q12 and Seroquel 25mg qd    Plan:  - Psychiatry consulted  - Will hold Lithium  - Will hold Abilify in setting of poly-antipsychotics  - Start Depakote 250 mg bid for mood stabilization, and titrate to 500 mg bid as tolerated  - Start Latuda 60 mg qd with meal  - Changed Klonopin to 1mg q12h

## 2019-12-09 NOTE — BEHAVIORAL HEALTH ASSESSMENT NOTE - OTHER
[employer] [facility] Clinical Director - [facility] [phone] Ext. [phone] [facility] (long-term 3-5 years) - [phone] not assessed lying in bed "Life is alba" none

## 2019-12-09 NOTE — PROGRESS NOTE ADULT - SUBJECTIVE AND OBJECTIVE BOX
*** NOTE IN PROGRESS ***    INTERVAL HPI/OVERNIGHT EVENTS:    SUBJECTIVE: Patient seen and examined at bedside.    OBJECTIVE:    VITAL SIGNS:  ICU Vital Signs Last 24 Hrs  T(C): 36.9 (09 Dec 2019 05:17), Max: 37.2 (08 Dec 2019 10:00)  T(F): 98.5 (09 Dec 2019 05:17), Max: 98.9 (08 Dec 2019 10:00)  HR: 98 (09 Dec 2019 05:17) (92 - 105)  BP: 119/73 (09 Dec 2019 05:17) (110/58 - 138/73)  BP(mean): 86 (08 Dec 2019 19:00) (77 - 111)  ABP: --  ABP(mean): --  RR: 16 (09 Dec 2019 05:17) (16 - 26)  SpO2: 97% (09 Dec 2019 05:17) (94% - 99%)        12-08 @ 07:01  -  12-09 @ 07:00  --------------------------------------------------------  IN: 2 mL / OUT: 1130 mL / NET: -1128 mL      CAPILLARY BLOOD GLUCOSE      POCT Blood Glucose.: 193 mg/dL (09 Dec 2019 09:12)      PHYSICAL EXAM:    General: NAD  HEENT: NC/AT; PERRL, clear conjunctiva  Neck: supple  Respiratory: CTA b/l  Cardiovascular: +S1/S2; RRR  Abdomen: soft, NT/ND; +BS x4  Extremities: WWP, 2+ peripheral pulses b/l; no LE edema  Skin: normal color and turgor; no rash  Neurological:     MEDICATIONS:  MEDICATIONS  (STANDING):  aspirin enteric coated 81 milliGRAM(s) Oral daily  clonazePAM  Tablet 2 milliGRAM(s) Oral every 12 hours  cyanocobalamin 1000 MICROGram(s) Oral daily  dextrose 5%. 1000 milliLiter(s) (50 mL/Hr) IV Continuous <Continuous>  dextrose 50% Injectable 12.5 Gram(s) IV Push once  dextrose 50% Injectable 25 Gram(s) IV Push once  dextrose 50% Injectable 25 Gram(s) IV Push once  enoxaparin Injectable 40 milliGRAM(s) SubCutaneous every 24 hours  folic acid 1 milliGRAM(s) Oral daily  influenza   Vaccine 0.5 milliLiter(s) IntraMuscular once  insulin glargine Injectable (LANTUS) 20 Unit(s) SubCutaneous at bedtime  insulin lispro (HumaLOG) corrective regimen sliding scale   SubCutaneous Before meals and at bedtime  insulin lispro Injectable (HumaLOG) 6 Unit(s) SubCutaneous three times a day before meals  multivitamin 1 Tablet(s) Oral daily  petrolatum white Ointment 1 Application(s) Topical two times a day  polyethylene glycol 3350 17 Gram(s) Oral daily  QUEtiapine 25 milliGRAM(s) Oral daily  senna 1 Tablet(s) Oral daily  simvastatin 20 milliGRAM(s) Oral at bedtime    MEDICATIONS  (PRN):  ALBUTerol    90 MICROgram(s) HFA Inhaler 2 Puff(s) Inhalation every 6 hours PRN Shortness of Breath and/or Wheezing  dextrose 40% Gel 15 Gram(s) Oral once PRN Blood Glucose LESS THAN 70 milliGRAM(s)/deciliter  glucagon  Injectable 1 milliGRAM(s) IntraMuscular once PRN Glucose LESS THAN 70 milligrams/deciliter  haloperidol    Injectable 5 milliGRAM(s) IV Push every 6 hours PRN Agitation      ALLERGIES:  Allergies    Allergy Status Unknown    Intolerances        LABS:                        10.6   9.09  )-----------( 284      ( 09 Dec 2019 05:45 )             33.5     12-09    139  |  107  |  4<L>  ----------------------------<  162<H>  4.7   |  24  |  0.57    Ca    9.5      09 Dec 2019 05:45  Phos  3.1     12-09  Mg     1.5     12-09            RADIOLOGY & ADDITIONAL TESTS: Reviewed. INTERVAL HPI/OVERNIGHT EVENTS: No acute events overnight. Refused fingersticks and senna. Vincent removed.    SUBJECTIVE: Patient seen and examined at bedside. Wants to take her morning medication. Sleepy, but denies acute complaints. Denies fever, headache, nausea, vomiting, chest pain, shortness of breath, abdominal pain, changes in bowel movements or urination.     OBJECTIVE:    VITAL SIGNS:  ICU Vital Signs Last 24 Hrs  T(C): 36.9 (09 Dec 2019 05:17), Max: 37.2 (08 Dec 2019 10:00)  T(F): 98.5 (09 Dec 2019 05:17), Max: 98.9 (08 Dec 2019 10:00)  HR: 98 (09 Dec 2019 05:17) (92 - 105)  BP: 119/73 (09 Dec 2019 05:17) (110/58 - 138/73)  BP(mean): 86 (08 Dec 2019 19:00) (77 - 111)  ABP: --  ABP(mean): --  RR: 16 (09 Dec 2019 05:17) (16 - 26)  SpO2: 97% (09 Dec 2019 05:17) (94% - 99%)        12-08 @ 07:01  -  12-09 @ 07:00  --------------------------------------------------------  IN: 2 mL / OUT: 1130 mL / NET: -1128 mL      CAPILLARY BLOOD GLUCOSE      POCT Blood Glucose.: 193 mg/dL (09 Dec 2019 09:12)      PHYSICAL EXAM:  Constitutional: WDWN resting comfortably in bed; NAD  Head: NC/AT, hair full of dandruff and unkempt  Eyes: EOMI, anicteric sclera  ENT: no nasal discharge  Neck: supple  Respiratory: CTA b/l (anterior),no W/R/R  Cardiac: Holosystolic murmur, regular rate and rhythm  Gastrointestinal: soft, NT/ND; no rebound or guarding; +BS  Extremities: WWP, no peripheral edema, 2+ pulses Radial and DP  Musculoskeletal: NROM x4  Dermatologic: Silver plaque over salmon base on elbows and scalp  Neurologic: Alert and oriented x2 (Person and State), no focal deficits appreciated      MEDICATIONS:  MEDICATIONS  (STANDING):  aspirin enteric coated 81 milliGRAM(s) Oral daily  clonazePAM  Tablet 2 milliGRAM(s) Oral every 12 hours  cyanocobalamin 1000 MICROGram(s) Oral daily  dextrose 5%. 1000 milliLiter(s) (50 mL/Hr) IV Continuous <Continuous>  dextrose 50% Injectable 12.5 Gram(s) IV Push once  dextrose 50% Injectable 25 Gram(s) IV Push once  dextrose 50% Injectable 25 Gram(s) IV Push once  enoxaparin Injectable 40 milliGRAM(s) SubCutaneous every 24 hours  folic acid 1 milliGRAM(s) Oral daily  influenza   Vaccine 0.5 milliLiter(s) IntraMuscular once  insulin glargine Injectable (LANTUS) 20 Unit(s) SubCutaneous at bedtime  insulin lispro (HumaLOG) corrective regimen sliding scale   SubCutaneous Before meals and at bedtime  insulin lispro Injectable (HumaLOG) 6 Unit(s) SubCutaneous three times a day before meals  multivitamin 1 Tablet(s) Oral daily  petrolatum white Ointment 1 Application(s) Topical two times a day  polyethylene glycol 3350 17 Gram(s) Oral daily  QUEtiapine 25 milliGRAM(s) Oral daily  senna 1 Tablet(s) Oral daily  simvastatin 20 milliGRAM(s) Oral at bedtime    MEDICATIONS  (PRN):  ALBUTerol    90 MICROgram(s) HFA Inhaler 2 Puff(s) Inhalation every 6 hours PRN Shortness of Breath and/or Wheezing  dextrose 40% Gel 15 Gram(s) Oral once PRN Blood Glucose LESS THAN 70 milliGRAM(s)/deciliter  glucagon  Injectable 1 milliGRAM(s) IntraMuscular once PRN Glucose LESS THAN 70 milligrams/deciliter  haloperidol    Injectable 5 milliGRAM(s) IV Push every 6 hours PRN Agitation      ALLERGIES:  Allergies    Allergy Status Unknown    Intolerances        LABS:                        10.6   9.09  )-----------( 284      ( 09 Dec 2019 05:45 )             33.5     12-09    139  |  107  |  4<L>  ----------------------------<  162<H>  4.7   |  24  |  0.57    Ca    9.5      09 Dec 2019 05:45  Phos  3.1     12-09  Mg     1.5     12-09            RADIOLOGY & ADDITIONAL TESTS: Reviewed.

## 2019-12-09 NOTE — BEHAVIORAL HEALTH ASSESSMENT NOTE - SUMMARY
Pt is a 53 y/o F with hx of schizoaffective, bipolar type, asthma, diabetes on insulin and metformin, HLD presented to [facility] from [facility] [address] ([phone]) for AMS - exhibiting confusion and agitation, shaking uncontrollably. Pt found to have Li level of 3.27 and required extensive PRN meds for agitation during ED course. Currently Li at 1.17 as of 12/8 @ 6 am. Per collateral from [facility], pt is childish and intellectually impaired at baseline. Poor recall of life events and psych/medical hx. 5-6 prior hospitalizations each lasting about 1 month, unknown locations. Med log at [facility] indicates pt is taking Li  mg tid, Abilify 30 mg qd, Latuda 60 mg qd, and Trazodone 50 mg qd. Pt denies suicidality, AH/VH.    Once medically stabilized, pt will most likely need psychiatric hospitalization for renewal and titration of Li. Pt is a 55 y/o F with hx of schizoaffective, bipolar type, asthma, diabetes on insulin and metformin, HLD presented to [facility] from [facility] [address] ([phone]) for AMS - exhibiting confusion and agitation, shaking uncontrollably. Pt found to have Li level of 3.27 and required extensive PRN meds for agitation during ED course. Currently Li at 1.17 as of 12/8 @ 6 am. Per collateral from [facility], pt is childish and intellectually impaired at baseline. Poor recall of life events and psych/medical hx. 5-6 prior hospitalizations each lasting about 1 month, unknown locations. Med log at [facility] indicates pt is taking Li  mg tid, Abilify 30 mg qd, Latuda 60 mg qd, and Trazodone 50 mg qd. Pt denies suicidality, AH/VH.    Pt appears cheerful and does not exhibit acute dulce, depression, or psychosis. Pt is a 53 y/o F with hx of schizoaffective, bipolar type, asthma, diabetes on insulin and metformin, HLD presented to [facility] from [facility] [address] ([phone]) for AMS - exhibiting confusion and agitation, shaking uncontrollably. Pt found to have Li level of 3.27 and required extensive PRN meds for agitation during ED course. Currently Li at 1.17 as of 12/8 @ 6 am. Per collateral from [facility], pt is childish and intellectually impaired at baseline. Poor recall of life events and psych/medical hx. 5-6 prior hospitalizations each lasting about 1 month, unknown locations. Med log at [facility] indicates pt is taking Li  mg tid, Abilify 30 mg qd, Latuda 60 mg qd, and Trazodone 50 mg qd. Pt denies suicidality, AH/VH.    Pt appears cheerful and does not exhibit acute dulce, depression, or psychosis. Pt does not report seeing OP psychiatrist, unclear who is prescribing Li.     - Will hold Li   - Will hold Abilify in setting of poly-antipsychotics  - Start Depakote 250 mg bid for mood stabilization, and titrate to 500 mg bid as tolerated  - C/w Latuda 60 mg qd with meal Pt is a 55 y/o F with hx of schizoaffective, bipolar type, asthma, diabetes on insulin and metformin, HLD presented to [facility] from [facility] [address] ([phone]) for AMS - exhibiting confusion and agitation, shaking uncontrollably. Pt found to have Li level of 3.27 and required extensive PRN meds for agitation during ED course. Currently Li at 1.17 as of 12/8 @ 6 am. Per collateral from [facility], pt is childish and intellectually impaired at baseline. Poor recall of life events and psych/medical hx. 5-6 prior hospitalizations each lasting about 1 month, unknown locations. Med log at [facility] indicates pt is taking Li  mg tid, Abilify 30 mg qd, Latuda 60 mg qd, and Trazodone 50 mg qd. Pt denies suicidality, AH/VH.    Pt appears cheerful and does not exhibit acute dulce, depression, or psychosis. Pt does not report seeing OP psychiatrist, unclear who is prescribing Li.     - Will hold Li   - Will hold Abilify in setting of poly-antipsychotics  - Start Depakote 250 mg bid for mood stabilization, and titrate to 500 mg bid as tolerated  - C/w Latuda 60 mg qd with meal  Decrease klonopin to 1 mg q12h from 2 mg q12h and stop seroquel.  -Will offer voluntary admission to [facility].

## 2019-12-09 NOTE — BEHAVIORAL HEALTH ASSESSMENT NOTE - PROBLEM SELECTOR PLAN 1
- Li on admission 3.27, as of 12/8 @ 6 am - 1.17  - pt endorses some subjective shakiness, but no GI issues, not assessed for gait instability/ataxia  - hold Li, will restart in IP psych unit - Li on admission 3.27, as of 12/8 @ 6 am - 1.17  - pt endorses some subjective shakiness, but no GI issues, not assessed for gait instability/ataxia  - hold Li

## 2019-12-10 ENCOUNTER — INPATIENT (INPATIENT)
Facility: HOSPITAL | Age: 54
LOS: 8 days | Discharge: HOME | DRG: 885 | End: 2019-12-19
Attending: PSYCHIATRY & NEUROLOGY | Admitting: PSYCHIATRY & NEUROLOGY
Payer: COMMERCIAL

## 2019-12-10 VITALS
SYSTOLIC BLOOD PRESSURE: 100 MMHG | OXYGEN SATURATION: 97 % | DIASTOLIC BLOOD PRESSURE: 68 MMHG | HEART RATE: 94 BPM | RESPIRATION RATE: 18 BRPM | TEMPERATURE: 98 F

## 2019-12-10 VITALS
OXYGEN SATURATION: 98 % | DIASTOLIC BLOOD PRESSURE: 89 MMHG | RESPIRATION RATE: 18 BRPM | HEART RATE: 106 BPM | TEMPERATURE: 99 F | SYSTOLIC BLOOD PRESSURE: 142 MMHG

## 2019-12-10 LAB
ANION GAP SERPL CALC-SCNC: 7 MMOL/L — SIGNIFICANT CHANGE UP (ref 5–17)
BUN SERPL-MCNC: 9 MG/DL — SIGNIFICANT CHANGE UP (ref 7–23)
CALCIUM SERPL-MCNC: 9.4 MG/DL — SIGNIFICANT CHANGE UP (ref 8.4–10.5)
CHLORIDE SERPL-SCNC: 102 MMOL/L — SIGNIFICANT CHANGE UP (ref 96–108)
CO2 SERPL-SCNC: 25 MMOL/L — SIGNIFICANT CHANGE UP (ref 22–31)
CREAT SERPL-MCNC: 0.63 MG/DL — SIGNIFICANT CHANGE UP (ref 0.5–1.3)
GLUCOSE BLDC GLUCOMTR-MCNC: 127 MG/DL — HIGH (ref 70–99)
GLUCOSE BLDC GLUCOMTR-MCNC: 219 MG/DL — HIGH (ref 70–99)
GLUCOSE BLDC GLUCOMTR-MCNC: 247 MG/DL — HIGH (ref 70–99)
GLUCOSE SERPL-MCNC: 258 MG/DL — HIGH (ref 70–99)
HCT VFR BLD CALC: 33.8 % — LOW (ref 34.5–45)
HGB BLD-MCNC: 10.7 G/DL — LOW (ref 11.5–15.5)
MAGNESIUM SERPL-MCNC: 1.3 MG/DL — LOW (ref 1.6–2.6)
MCHC RBC-ENTMCNC: 29.2 PG — SIGNIFICANT CHANGE UP (ref 27–34)
MCHC RBC-ENTMCNC: 31.7 GM/DL — LOW (ref 32–36)
MCV RBC AUTO: 92.1 FL — SIGNIFICANT CHANGE UP (ref 80–100)
NRBC # BLD: 0 /100 WBCS — SIGNIFICANT CHANGE UP (ref 0–0)
PHOSPHATE SERPL-MCNC: 4.1 MG/DL — SIGNIFICANT CHANGE UP (ref 2.5–4.5)
PLATELET # BLD AUTO: 279 K/UL — SIGNIFICANT CHANGE UP (ref 150–400)
POTASSIUM SERPL-MCNC: 4.7 MMOL/L — SIGNIFICANT CHANGE UP (ref 3.5–5.3)
POTASSIUM SERPL-SCNC: 4.7 MMOL/L — SIGNIFICANT CHANGE UP (ref 3.5–5.3)
RBC # BLD: 3.67 M/UL — LOW (ref 3.8–5.2)
RBC # FLD: 14.4 % — SIGNIFICANT CHANGE UP (ref 10.3–14.5)
SODIUM SERPL-SCNC: 134 MMOL/L — LOW (ref 135–145)
WBC # BLD: 7.77 K/UL — SIGNIFICANT CHANGE UP (ref 3.8–10.5)
WBC # FLD AUTO: 7.77 K/UL — SIGNIFICANT CHANGE UP (ref 3.8–10.5)

## 2019-12-10 PROCEDURE — 96376 TX/PRO/DX INJ SAME DRUG ADON: CPT

## 2019-12-10 PROCEDURE — 93005 ELECTROCARDIOGRAM TRACING: CPT

## 2019-12-10 PROCEDURE — 82570 ASSAY OF URINE CREATININE: CPT

## 2019-12-10 PROCEDURE — 80307 DRUG TEST PRSMV CHEM ANLYZR: CPT

## 2019-12-10 PROCEDURE — 87040 BLOOD CULTURE FOR BACTERIA: CPT

## 2019-12-10 PROCEDURE — 96374 THER/PROPH/DIAG INJ IV PUSH: CPT

## 2019-12-10 PROCEDURE — 81025 URINE PREGNANCY TEST: CPT

## 2019-12-10 PROCEDURE — 80048 BASIC METABOLIC PNL TOTAL CA: CPT

## 2019-12-10 PROCEDURE — 86900 BLOOD TYPING SEROLOGIC ABO: CPT

## 2019-12-10 PROCEDURE — 86901 BLOOD TYPING SEROLOGIC RH(D): CPT

## 2019-12-10 PROCEDURE — 85730 THROMBOPLASTIN TIME PARTIAL: CPT

## 2019-12-10 PROCEDURE — 87631 RESP VIRUS 3-5 TARGETS: CPT

## 2019-12-10 PROCEDURE — 99239 HOSP IP/OBS DSCHRG MGMT >30: CPT | Mod: GC

## 2019-12-10 PROCEDURE — 96372 THER/PROPH/DIAG INJ SC/IM: CPT | Mod: XU

## 2019-12-10 PROCEDURE — 83735 ASSAY OF MAGNESIUM: CPT

## 2019-12-10 PROCEDURE — 80053 COMPREHEN METABOLIC PANEL: CPT

## 2019-12-10 PROCEDURE — 84100 ASSAY OF PHOSPHORUS: CPT

## 2019-12-10 PROCEDURE — 83036 HEMOGLOBIN GLYCOSYLATED A1C: CPT

## 2019-12-10 PROCEDURE — 36415 COLL VENOUS BLD VENIPUNCTURE: CPT

## 2019-12-10 PROCEDURE — 71045 X-RAY EXAM CHEST 1 VIEW: CPT

## 2019-12-10 PROCEDURE — 85610 PROTHROMBIN TIME: CPT

## 2019-12-10 PROCEDURE — 80178 ASSAY OF LITHIUM: CPT

## 2019-12-10 PROCEDURE — 85027 COMPLETE CBC AUTOMATED: CPT

## 2019-12-10 PROCEDURE — 82962 GLUCOSE BLOOD TEST: CPT

## 2019-12-10 PROCEDURE — 96375 TX/PRO/DX INJ NEW DRUG ADDON: CPT

## 2019-12-10 PROCEDURE — 83935 ASSAY OF URINE OSMOLALITY: CPT

## 2019-12-10 PROCEDURE — 82803 BLOOD GASES ANY COMBINATION: CPT

## 2019-12-10 PROCEDURE — 86803 HEPATITIS C AB TEST: CPT

## 2019-12-10 PROCEDURE — 97161 PT EVAL LOW COMPLEX 20 MIN: CPT

## 2019-12-10 PROCEDURE — 87086 URINE CULTURE/COLONY COUNT: CPT

## 2019-12-10 PROCEDURE — 83930 ASSAY OF BLOOD OSMOLALITY: CPT

## 2019-12-10 PROCEDURE — 81001 URINALYSIS AUTO W/SCOPE: CPT

## 2019-12-10 PROCEDURE — 84133 ASSAY OF URINE POTASSIUM: CPT

## 2019-12-10 PROCEDURE — 87449 NOS EACH ORGANISM AG IA: CPT

## 2019-12-10 PROCEDURE — 99291 CRITICAL CARE FIRST HOUR: CPT | Mod: 25

## 2019-12-10 PROCEDURE — 76770 US EXAM ABDO BACK WALL COMP: CPT

## 2019-12-10 PROCEDURE — 84443 ASSAY THYROID STIM HORMONE: CPT

## 2019-12-10 PROCEDURE — 86850 RBC ANTIBODY SCREEN: CPT

## 2019-12-10 PROCEDURE — 70450 CT HEAD/BRAIN W/O DYE: CPT

## 2019-12-10 PROCEDURE — 82436 ASSAY OF URINE CHLORIDE: CPT

## 2019-12-10 PROCEDURE — 85025 COMPLETE CBC W/AUTO DIFF WBC: CPT

## 2019-12-10 PROCEDURE — 99233 SBSQ HOSP IP/OBS HIGH 50: CPT

## 2019-12-10 PROCEDURE — 99292 CRITICAL CARE ADDL 30 MIN: CPT | Mod: 25

## 2019-12-10 PROCEDURE — 84300 ASSAY OF URINE SODIUM: CPT

## 2019-12-10 RX ORDER — INSULIN GLARGINE 100 [IU]/ML
20 INJECTION, SOLUTION SUBCUTANEOUS AT BEDTIME
Refills: 0 | Status: DISCONTINUED | OUTPATIENT
Start: 2019-12-10 | End: 2019-12-12

## 2019-12-10 RX ORDER — ARIPIPRAZOLE 15 MG/1
1 TABLET ORAL
Qty: 0 | Refills: 0 | DISCHARGE

## 2019-12-10 RX ORDER — CLONAZEPAM 1 MG
1 TABLET ORAL EVERY 12 HOURS
Refills: 0 | Status: DISCONTINUED | OUTPATIENT
Start: 2019-12-10 | End: 2019-12-17

## 2019-12-10 RX ORDER — LURASIDONE HYDROCHLORIDE 40 MG/1
80 TABLET ORAL DAILY
Refills: 0 | Status: DISCONTINUED | OUTPATIENT
Start: 2019-12-10 | End: 2019-12-11

## 2019-12-10 RX ORDER — DEXTROSE 50 % IN WATER 50 %
25 SYRINGE (ML) INTRAVENOUS ONCE
Refills: 0 | Status: DISCONTINUED | OUTPATIENT
Start: 2019-12-10 | End: 2019-12-19

## 2019-12-10 RX ORDER — SENNA PLUS 8.6 MG/1
1 TABLET ORAL DAILY
Refills: 0 | Status: DISCONTINUED | OUTPATIENT
Start: 2019-12-10 | End: 2019-12-19

## 2019-12-10 RX ORDER — CLONAZEPAM 1 MG
1 TABLET ORAL
Qty: 0 | Refills: 0 | DISCHARGE

## 2019-12-10 RX ORDER — FOLIC ACID 0.8 MG
1 TABLET ORAL
Qty: 0 | Refills: 0 | DISCHARGE

## 2019-12-10 RX ORDER — ACETAMINOPHEN 500 MG
650 TABLET ORAL EVERY 6 HOURS
Refills: 0 | Status: DISCONTINUED | OUTPATIENT
Start: 2019-12-10 | End: 2019-12-19

## 2019-12-10 RX ORDER — PREGABALIN 225 MG/1
1000 CAPSULE ORAL DAILY
Refills: 0 | Status: DISCONTINUED | OUTPATIENT
Start: 2019-12-10 | End: 2019-12-19

## 2019-12-10 RX ORDER — GLUCAGON INJECTION, SOLUTION 0.5 MG/.1ML
1 INJECTION, SOLUTION SUBCUTANEOUS
Qty: 0 | Refills: 0 | DISCHARGE
Start: 2019-12-10

## 2019-12-10 RX ORDER — SIMVASTATIN 20 MG/1
20 TABLET, FILM COATED ORAL AT BEDTIME
Refills: 0 | Status: DISCONTINUED | OUTPATIENT
Start: 2019-12-10 | End: 2019-12-19

## 2019-12-10 RX ORDER — ASPIRIN/CALCIUM CARB/MAGNESIUM 324 MG
1 TABLET ORAL
Qty: 0 | Refills: 0 | DISCHARGE
Start: 2019-12-10

## 2019-12-10 RX ORDER — PREGABALIN 225 MG/1
1 CAPSULE ORAL
Qty: 0 | Refills: 0 | DISCHARGE

## 2019-12-10 RX ORDER — SODIUM CHLORIDE 9 MG/ML
1000 INJECTION, SOLUTION INTRAVENOUS
Qty: 0 | Refills: 0 | DISCHARGE
Start: 2019-12-10

## 2019-12-10 RX ORDER — DEXTROSE 50 % IN WATER 50 %
50 SYRINGE (ML) INTRAVENOUS
Qty: 0 | Refills: 0 | DISCHARGE
Start: 2019-12-10

## 2019-12-10 RX ORDER — DEXTROSE 50 % IN WATER 50 %
12.5 SYRINGE (ML) INTRAVENOUS ONCE
Refills: 0 | Status: DISCONTINUED | OUTPATIENT
Start: 2019-12-10 | End: 2019-12-19

## 2019-12-10 RX ORDER — SODIUM CHLORIDE 9 MG/ML
1000 INJECTION, SOLUTION INTRAVENOUS
Refills: 0 | Status: DISCONTINUED | OUTPATIENT
Start: 2019-12-10 | End: 2019-12-19

## 2019-12-10 RX ORDER — MAGNESIUM SULFATE 500 MG/ML
2 VIAL (ML) INJECTION ONCE
Refills: 0 | Status: COMPLETED | OUTPATIENT
Start: 2019-12-10 | End: 2019-12-10

## 2019-12-10 RX ORDER — FOLIC ACID 0.8 MG
1 TABLET ORAL DAILY
Refills: 0 | Status: DISCONTINUED | OUTPATIENT
Start: 2019-12-10 | End: 2019-12-19

## 2019-12-10 RX ORDER — POLYETHYLENE GLYCOL 3350 17 G/17G
17 POWDER, FOR SOLUTION ORAL
Qty: 0 | Refills: 0 | DISCHARGE
Start: 2019-12-10

## 2019-12-10 RX ORDER — PETROLATUM,WHITE
1 JELLY (GRAM) TOPICAL
Refills: 0 | Status: DISCONTINUED | OUTPATIENT
Start: 2019-12-10 | End: 2019-12-19

## 2019-12-10 RX ORDER — HALOPERIDOL DECANOATE 100 MG/ML
5 INJECTION INTRAMUSCULAR EVERY 6 HOURS
Refills: 0 | Status: DISCONTINUED | OUTPATIENT
Start: 2019-12-10 | End: 2019-12-19

## 2019-12-10 RX ORDER — ENOXAPARIN SODIUM 100 MG/ML
40 INJECTION SUBCUTANEOUS
Qty: 0 | Refills: 0 | DISCHARGE
Start: 2019-12-10

## 2019-12-10 RX ORDER — LITHIUM CARBONATE 300 MG/1
450 TABLET, EXTENDED RELEASE ORAL
Qty: 0 | Refills: 0 | DISCHARGE

## 2019-12-10 RX ORDER — OMEPRAZOLE 10 MG/1
1 CAPSULE, DELAYED RELEASE ORAL
Qty: 0 | Refills: 0 | DISCHARGE

## 2019-12-10 RX ORDER — INSULIN LISPRO 100/ML
6 VIAL (ML) SUBCUTANEOUS
Refills: 0 | Status: DISCONTINUED | OUTPATIENT
Start: 2019-12-10 | End: 2019-12-11

## 2019-12-10 RX ORDER — DEXTROSE 50 % IN WATER 50 %
15 SYRINGE (ML) INTRAVENOUS ONCE
Refills: 0 | Status: DISCONTINUED | OUTPATIENT
Start: 2019-12-10 | End: 2019-12-19

## 2019-12-10 RX ORDER — POLYETHYLENE GLYCOL 3350 17 G/17G
17 POWDER, FOR SOLUTION ORAL DAILY
Refills: 0 | Status: DISCONTINUED | OUTPATIENT
Start: 2019-12-10 | End: 2019-12-19

## 2019-12-10 RX ORDER — DIPHENHYDRAMINE HCL 50 MG
50 CAPSULE ORAL EVERY 6 HOURS
Refills: 0 | Status: DISCONTINUED | OUTPATIENT
Start: 2019-12-10 | End: 2019-12-19

## 2019-12-10 RX ORDER — ALBUTEROL 90 UG/1
2 AEROSOL, METERED ORAL EVERY 6 HOURS
Refills: 0 | Status: DISCONTINUED | OUTPATIENT
Start: 2019-12-10 | End: 2019-12-19

## 2019-12-10 RX ORDER — ASPIRIN/CALCIUM CARB/MAGNESIUM 324 MG
1 TABLET ORAL
Qty: 0 | Refills: 0 | DISCHARGE

## 2019-12-10 RX ORDER — METFORMIN HYDROCHLORIDE 850 MG/1
1 TABLET ORAL
Qty: 0 | Refills: 0 | DISCHARGE

## 2019-12-10 RX ORDER — INSULIN LISPRO 100/ML
VIAL (ML) SUBCUTANEOUS
Refills: 0 | Status: DISCONTINUED | OUTPATIENT
Start: 2019-12-10 | End: 2019-12-19

## 2019-12-10 RX ORDER — CLONAZEPAM 1 MG
1 TABLET ORAL
Qty: 0 | Refills: 0 | DISCHARGE
Start: 2019-12-10

## 2019-12-10 RX ORDER — FOLIC ACID 0.8 MG
1 TABLET ORAL
Qty: 0 | Refills: 0 | DISCHARGE
Start: 2019-12-10

## 2019-12-10 RX ORDER — PREGABALIN 225 MG/1
1 CAPSULE ORAL
Qty: 0 | Refills: 0 | DISCHARGE
Start: 2019-12-10

## 2019-12-10 RX ORDER — TRAZODONE HCL 50 MG
50 TABLET ORAL AT BEDTIME
Refills: 0 | Status: DISCONTINUED | OUTPATIENT
Start: 2019-12-10 | End: 2019-12-19

## 2019-12-10 RX ORDER — DEXTROSE 50 % IN WATER 50 %
25 SYRINGE (ML) INTRAVENOUS
Qty: 0 | Refills: 0 | DISCHARGE
Start: 2019-12-10

## 2019-12-10 RX ORDER — ACETAMINOPHEN 500 MG
2 TABLET ORAL
Qty: 0 | Refills: 0 | DISCHARGE

## 2019-12-10 RX ORDER — ALBUTEROL 90 UG/1
2 AEROSOL, METERED ORAL
Qty: 0 | Refills: 0 | DISCHARGE
Start: 2019-12-10

## 2019-12-10 RX ORDER — INSULIN LISPRO 100/ML
6 VIAL (ML) SUBCUTANEOUS
Qty: 0 | Refills: 0 | DISCHARGE
Start: 2019-12-10

## 2019-12-10 RX ORDER — GLUCAGON INJECTION, SOLUTION 0.5 MG/.1ML
1 INJECTION, SOLUTION SUBCUTANEOUS ONCE
Refills: 0 | Status: DISCONTINUED | OUTPATIENT
Start: 2019-12-10 | End: 2019-12-19

## 2019-12-10 RX ORDER — PETROLATUM,WHITE
1 JELLY (GRAM) TOPICAL
Qty: 0 | Refills: 0 | DISCHARGE
Start: 2019-12-10

## 2019-12-10 RX ORDER — TRAZODONE HCL 50 MG
1 TABLET ORAL
Qty: 0 | Refills: 0 | DISCHARGE

## 2019-12-10 RX ORDER — SENNA PLUS 8.6 MG/1
1 TABLET ORAL
Qty: 0 | Refills: 0 | DISCHARGE
Start: 2019-12-10

## 2019-12-10 RX ORDER — SEMAGLUTIDE 0.68 MG/ML
1 INJECTION, SOLUTION SUBCUTANEOUS
Qty: 0 | Refills: 0 | DISCHARGE

## 2019-12-10 RX ORDER — INSULIN GLARGINE 100 [IU]/ML
20 INJECTION, SOLUTION SUBCUTANEOUS
Qty: 0 | Refills: 0 | DISCHARGE
Start: 2019-12-10

## 2019-12-10 RX ORDER — LURASIDONE HYDROCHLORIDE 40 MG/1
1 TABLET ORAL
Qty: 0 | Refills: 0 | DISCHARGE
Start: 2019-12-10

## 2019-12-10 RX ORDER — LURASIDONE HYDROCHLORIDE 40 MG/1
1 TABLET ORAL
Qty: 0 | Refills: 0 | DISCHARGE

## 2019-12-10 RX ADMIN — Medication 1 TABLET(S): at 12:33

## 2019-12-10 RX ADMIN — Medication 1 MILLIGRAM(S): at 05:50

## 2019-12-10 RX ADMIN — Medication 1 APPLICATION(S): at 05:49

## 2019-12-10 RX ADMIN — INSULIN GLARGINE 20 UNIT(S): 100 INJECTION, SOLUTION SUBCUTANEOUS at 22:11

## 2019-12-10 RX ADMIN — Medication 1 MILLIGRAM(S): at 19:56

## 2019-12-10 RX ADMIN — Medication 4: at 09:03

## 2019-12-10 RX ADMIN — SENNA PLUS 1 TABLET(S): 8.6 TABLET ORAL at 12:33

## 2019-12-10 RX ADMIN — Medication 6 UNIT(S): at 12:32

## 2019-12-10 RX ADMIN — Medication 81 MILLIGRAM(S): at 12:33

## 2019-12-10 RX ADMIN — Medication 6 UNIT(S): at 09:03

## 2019-12-10 RX ADMIN — Medication 8: at 22:10

## 2019-12-10 RX ADMIN — ENOXAPARIN SODIUM 40 MILLIGRAM(S): 100 INJECTION SUBCUTANEOUS at 05:49

## 2019-12-10 RX ADMIN — Medication 6 UNIT(S): at 18:08

## 2019-12-10 RX ADMIN — POLYETHYLENE GLYCOL 3350 17 GRAM(S): 17 POWDER, FOR SOLUTION ORAL at 12:33

## 2019-12-10 RX ADMIN — LURASIDONE HYDROCHLORIDE 60 MILLIGRAM(S): 40 TABLET ORAL at 12:33

## 2019-12-10 RX ADMIN — Medication 1 MILLIGRAM(S): at 12:42

## 2019-12-10 RX ADMIN — Medication 4: at 12:31

## 2019-12-10 RX ADMIN — DIVALPROEX SODIUM 250 MILLIGRAM(S): 500 TABLET, DELAYED RELEASE ORAL at 05:49

## 2019-12-10 RX ADMIN — PREGABALIN 1000 MICROGRAM(S): 225 CAPSULE ORAL at 12:33

## 2019-12-10 RX ADMIN — Medication 50 GRAM(S): at 12:32

## 2019-12-10 RX ADMIN — Medication 1 MILLIGRAM(S): at 22:09

## 2019-12-10 RX ADMIN — Medication 1 APPLICATION(S): at 18:24

## 2019-12-10 RX ADMIN — SIMVASTATIN 20 MILLIGRAM(S): 20 TABLET, FILM COATED ORAL at 22:08

## 2019-12-10 NOTE — PROGRESS NOTE ADULT - PROBLEM SELECTOR PLAN 9
F: none  E: replete K <4, Mg <2  N: DASH diet, Carb consistent- continue MV, B12, folate  Last BM: 3 weeks ago per patient- start senna and miralax  VTE: Lovenox 40mg qd  GI PPx: Not inidicated  Sleep Aid: none  Dispo: [facility]  Code: Full

## 2019-12-10 NOTE — PROGRESS NOTE ADULT - PROBLEM SELECTOR PLAN 3
On admission, w/ Na of 124. Overcorrected with 3 L NS to 134. Then up to 142. Likely 2/2 to SIADH from chronic lithium use. Serum Osm 298, Urine Osm 162, Urine Na 62.  -continue to monitor BMP   -currently asymptomatic  - As patient Na never below 120, lower risk of osmotic demyelination- monitor mental status  - Na currently stable at 139

## 2019-12-10 NOTE — PROGRESS NOTE ADULT - PROBLEM SELECTOR PLAN 2
home metformin 1000mg q12 and Latuda 60mg qhs  - c/w lantus 20 units QHS and 6U pre-meals   - c/w ISS  ** Patient refused last nights dose of lantus, will observe tomorrow on basal dose.

## 2019-12-10 NOTE — PROGRESS NOTE ADULT - PROBLEM SELECTOR PLAN 4
Resolved. Presented with confusion and agitation to [facility]. Found to have lithium level 3.27. After fluid resuscitation and withholding medicine now WNL.  -s/p recs from poison control  -Renal u/s: w/o evidence of lithium toxicity  -CTH w/o acute change  -Li level downtrended to within normal limits

## 2019-12-10 NOTE — PROGRESS NOTE ADULT - ATTENDING COMMENTS
Patient seen and examined with house-staff during bedside rounds.  Resident note read, including vitals, physical findings, laboratory data, and radiological reports.   Revisions included below.  Direct personal management at bed side and extensive interpretation of the data.  Plan was outlined and discussed in details with the housestaff.  Decision making of high complexity  Action taken for acute disease activity to reflect the level of care provided:  - medication reconciliation  - review laboratory data  Patient is clinically stable. She is tolerating diet. The blood pressure is controlled. Pimento and psychological status is stable. No evidence of infection. She is off antibiotic. Patient was evaluated for transfer to regular floor and followup with psychiatry
Patient seen and examined with house-staff during bedside rounds.  Resident note read, including vitals, physical findings, laboratory data, and radiological reports.   Revisions included below.  Direct personal management at bed side and extensive interpretation of the data.  Plan was outlined and discussed in details with the housestaff.  Decision making of high complexity  Action taken for acute disease activity to reflect the level of care provided:  - medication reconciliation  - review laboratory data  she is able to protect her airway  no pneumonia  Dc antibiotic  psychiatry consult  Cr stable  Lithium level decreasing  requiring sedative  on precedex  Na level is stable
Apprec Psych recs, plan for involuntary admission to [facility]  Borderline controlled DM, adjusting meds, c/w current lantus/lispro regimen for now, if needed be Medicine Consult can follow up patient while in [facility].  Stable for discharge to inpatient psych  Rest as above
Apprec Psych recs  BMI of 31 --> Obesity  Rest as above

## 2019-12-10 NOTE — PROGRESS NOTE BEHAVIORAL HEALTH - NSBHFUPINTERVALHXFT_PSY_A_CORE
Spoke to her last psychiatrist at [facility], Dr. Maxim Hernandez, who told me depakote caused hyponatremia so depakote discontinued. Also spoke with [facility] who says she has an appt at [facility] on 12/24 she was already planning not to attend as it's Xmas dragan. Pt did not want to sign voluntary for admission to [facility] so [facility] being done as pt unable to care for self without psychotropic regimen which needs to be started from scratch.

## 2019-12-10 NOTE — PROGRESS NOTE BEHAVIORAL HEALTH - NSBHADMITCOUNSEL_PSY_A_CORE
risks and benefits of treatment options/importance of adherence to chosen treatment/diagnostic results/impressions and/or recommended studies/client/family/caregiver education/other.../prognosis/instructions for management, treatment and follow up/risk factor reduction

## 2019-12-10 NOTE — PROGRESS NOTE ADULT - PROBLEM SELECTOR PLAN 10
1) PCP - none recorded- follow up from [facility] Monday  2) Date of Contact with PCP: n/a  3) PCP Contacted at Discharge: TBD  4) Summary of Handoff Given to PCP: TBD  5) Post-Discharge Appointment Date and Location: TBD
1) PCP - none recorded- follow up from [facility] Monday  2) Date of Contact with PCP: n/a  3) PCP Contacted at Discharge: TBD  4) Summary of Handoff Given to PCP: TBD  5) Post-Discharge Appointment Date and Location: TBD
1) PCP - none recorded- follow up from [facility] Monday  2) Date of Contact with PCP: n/a  3) PCP Contacted at Discharge: TBD  4) Summary of Handoff Given to PCP: TBD  5) Post-Discharge Appointment Date and Location: TBD

## 2019-12-10 NOTE — PROGRESS NOTE ADULT - REASON FOR ADMISSION
AMS, lithium toxicity

## 2019-12-10 NOTE — DISCHARGE NOTE PROVIDER - NSDCCPCAREPLAN_GEN_ALL_CORE_FT
PRINCIPAL DISCHARGE DIAGNOSIS  Diagnosis: Lithium toxicity, accidental or unintentional, initial encounter  Assessment and Plan of Treatment: You came in with high levels of lithium that have resolved.      SECONDARY DISCHARGE DIAGNOSES  Diagnosis: Bipolar disorder  Assessment and Plan of Treatment: You have a history of bipolar disorder that needs follow-up in the hospital

## 2019-12-10 NOTE — PROGRESS NOTE BEHAVIORAL HEALTH - RISK ASSESSMENT
The patient presents with lithium toxicity which was possibly precipitated by incorrect dosing schedule. Factors that seem to have predisposed her to lithium toxicity are low intellectual status at baseline, poor social support, no family support, and hx of schizoaffective, bipolar type. Protective factors include hx of treatment responsiveness and housing stability.

## 2019-12-10 NOTE — PROGRESS NOTE ADULT - SUBJECTIVE AND OBJECTIVE BOX
INTERVAL HPI/OVERNIGHT EVENTS: No acute events overnight.    SUBJECTIVE: Patient seen and examined at bedside. Patient feels much better this morning. Wants to take her morning medications. Is ready to get a walker and work with PT to "build up" her strength. Denies fever, headache, nausea, vomiting, chest pain, shortness of breath, abdominal pain, changes in bowel movements or urination.     OBJECTIVE:    VITAL SIGNS:  ICU Vital Signs Last 24 Hrs  T(C): 36.4 (10 Dec 2019 06:09), Max: 37.2 (09 Dec 2019 12:10)  T(F): 97.6 (10 Dec 2019 06:09), Max: 98.9 (09 Dec 2019 12:10)  HR: 89 (10 Dec 2019 06:09) (89 - 99)  BP: 117/78 (10 Dec 2019 06:09) (97/68 - 117/78)  BP(mean): --  ABP: --  ABP(mean): --  RR: 18 (10 Dec 2019 06:09) (16 - 18)  SpO2: 96% (10 Dec 2019 06:09) (94% - 96%)        CAPILLARY BLOOD GLUCOSE      POCT Blood Glucose.: 248 mg/dL (09 Dec 2019 22:03)      PHYSICAL EXAM:  Constitutional: WDWN resting comfortably in bed; NAD  Head: NC/AT, hair full of dandruff and unkempt  Eyes: EOMI, anicteric sclera  ENT: no nasal discharge  Neck: supple  Respiratory: CTA b/l (anterior),no W/R/R  Cardiac: Holosystolic murmur, regular rate and rhythm  Gastrointestinal: soft, NT/ND; no rebound or guarding; +BS  Extremities: WWP, no peripheral edema, 2+ pulses Radial and DP  Musculoskeletal: NROM x4  Dermatologic: Silver plaque over salmon base on elbows and scalp  Neurologic: Alert and oriented x2 (Person and State), no focal deficits appreciated         MEDICATIONS:  MEDICATIONS  (STANDING):  aspirin enteric coated 81 milliGRAM(s) Oral daily  clonazePAM  Tablet 1 milliGRAM(s) Oral every 12 hours  cyanocobalamin 1000 MICROGram(s) Oral daily  dextrose 5%. 1000 milliLiter(s) (50 mL/Hr) IV Continuous <Continuous>  dextrose 50% Injectable 12.5 Gram(s) IV Push once  dextrose 50% Injectable 25 Gram(s) IV Push once  dextrose 50% Injectable 25 Gram(s) IV Push once  diVALproex  milliGRAM(s) Oral two times a day  enoxaparin Injectable 40 milliGRAM(s) SubCutaneous every 24 hours  folic acid 1 milliGRAM(s) Oral daily  influenza   Vaccine 0.5 milliLiter(s) IntraMuscular once  insulin glargine Injectable (LANTUS) 20 Unit(s) SubCutaneous at bedtime  insulin lispro (HumaLOG) corrective regimen sliding scale   SubCutaneous Before meals and at bedtime  insulin lispro Injectable (HumaLOG) 6 Unit(s) SubCutaneous three times a day before meals  lurasidone 60 milliGRAM(s) Oral daily  magnesium sulfate  IVPB 2 Gram(s) IV Intermittent once  magnesium sulfate  IVPB 2 Gram(s) IV Intermittent once  multivitamin 1 Tablet(s) Oral daily  petrolatum white Ointment 1 Application(s) Topical two times a day  polyethylene glycol 3350 17 Gram(s) Oral daily  senna 1 Tablet(s) Oral daily  simvastatin 20 milliGRAM(s) Oral at bedtime    MEDICATIONS  (PRN):  ALBUTerol    90 MICROgram(s) HFA Inhaler 2 Puff(s) Inhalation every 6 hours PRN Shortness of Breath and/or Wheezing  dextrose 40% Gel 15 Gram(s) Oral once PRN Blood Glucose LESS THAN 70 milliGRAM(s)/deciliter  glucagon  Injectable 1 milliGRAM(s) IntraMuscular once PRN Glucose LESS THAN 70 milligrams/deciliter  haloperidol    Injectable 5 milliGRAM(s) IV Push every 6 hours PRN Agitation      ALLERGIES:  Allergies    Allergy Status Unknown    Intolerances        LABS:                        10.7   7.77  )-----------( 279      ( 10 Dec 2019 06:42 )             33.8     12-10    134<L>  |  102  |  9   ----------------------------<  258<H>  4.7   |  25  |  0.63    Ca    9.4      10 Dec 2019 06:42  Phos  4.1     12-10  Mg     1.3     12-10            RADIOLOGY & ADDITIONAL TESTS: Reviewed.

## 2019-12-10 NOTE — PROGRESS NOTE BEHAVIORAL HEALTH - NSBHADMITCOUNSELOTHER_PSY_A_CORE FT
Discussed pros and cons and necessity of inpatient hospitalization, coordinated admission to [facility] with medicine staff and psych staff, spoke with outpatient psychiatrist and psychiatric residence, [facility]

## 2019-12-10 NOTE — PROGRESS NOTE ADULT - PROBLEM SELECTOR PLAN 8
Patient with Hb WNL on admission. Now anemic. All cell lines down after several liters of fluid. Most likely dilutional.   -If it persists, do full eval  -trend CBC  -T&S in AM

## 2019-12-10 NOTE — DISCHARGE NOTE PROVIDER - HOSPITAL COURSE
55 y/o woman with bipolar disorder type who presented with AMS likely 2/2 to lithium toxicity. Course complicated by over corrected hyponatremia. Dispo planning and pending re-instation of home meds on [facility] with psychiatry vs discharge back to [facility].        # Bipolar 2 disorder.      - pt on lithium 450mg qd, abilify 30mg qd. s/p precedex for agitation, required levophed for pressures support    - d/c Klonopin 2mg q12 and Seroquel 25mg qd    - Will hold Lithium    - Will hold Abilify in setting of poly-antipsychotics    - d/c Depakote 250 mg bid    - Start Latuda 60 mg qd with meal    - Changed Klonopin to 1mg q12h.     - Admit to [facility]        # Type II diabetes mellitus.      - home metformin 1000mg q12 and Latuda 60mg qhs    - c/w lantus 20 units QHS and 6U pre-meals     - c/w ISS        # Hyponatremia, resolved      - On admission, w/ Na of 124. Overcorrected with 3 L NS to 134. Then up to 142. Likely 2/2 to SIADH from chronic lithium use. Serum Osm 298, Urine Osm 162, Urine Na 62.    -continue to monitor BMP     -currently asymptomatic    - As patient Na never below 120, lower risk of osmotic demyelination- monitor mental status    - Na currently stable at 139.         # Lithium toxicity, accidental or unintentional, initial encounter.      - Resolved. Presented with confusion and agitation to [facility]. Found to have lithium level 3.27. After fluid resuscitation and withholding medicine now WNL.    -s/p recs from poison control    -Renal u/s: w/o evidence of lithium toxicity    -CTH w/o acute change    -Li level downtrended to within normal limits.         # Asthma.      - Pt w hx of asthma    -MARIE, no wheezing on PE     -albuterol q6 PRN.         # Psoriasis.     - Apparent plaques on elbows and scalp    - petroleum jelly BID    - determine if topical steroids appropriate for scalp in AM.        # HLD (hyperlipidemia).      - s/w home simvastatin 20mg qnightly.         # Anemia.      - Patient with Hb WNL on admission. Now anemic. All cell lines down after several liters of fluid. Most likely dilutional.         New Medications: Latuda 60mg and Klonopin 1mg q12h    Labs to follow: None    Exams to follow: None 04-Sep-2019 17:47

## 2019-12-10 NOTE — PHYSICAL THERAPY INITIAL EVALUATION ADULT - PERTINENT HX OF CURRENT PROBLEM, REHAB EVAL
54F presented to [facility] from [facility] for AMS - exhibiting confusion and agitation, shaking uncontrollably. Unclear baseline although pt with low intellectual functioning. Difficult to obtain a hx from pt due to pt was given sedatives and underlying mental disorder.

## 2019-12-10 NOTE — PROGRESS NOTE BEHAVIORAL HEALTH - NSBHFUPINTERVALCCFT_PSY_A_CORE
Pt is a 55 y/o F with hx of schizoaffective, bipolar type, asthma, diabetes on insulin and metformin, HLD presented to [facility] from [facility] [address] ([phone]) for AMS - exhibiting confusion and agitation, shaking uncontrollably. Pt found to have Li level of 3.27 and required extensive PRN meds for agitation during ED course. Currently Li at 1.17 as of 12/8 @ 6 am. Per collateral from [facility], pt is childish and intellectually impaired at baseline. Poor recall of life events and psych/medical hx. 5-6 prior hospitalizations each lasting about 1 month, unknown locations. Med log at [facility] indicates pt is taking Li  mg tid, Abilify 30 mg qd, Latuda 60 mg qd, and Trazodone 50 mg qd. Pt denies suicidality, AH/VH.    Pt appears cheerful and does not exhibit acute dulce, depression, or psychosis. Pt does not report seeing OP psychiatrist, unclear who is prescribing Li.

## 2019-12-10 NOTE — PROGRESS NOTE ADULT - PROBLEM SELECTOR PLAN 1
pt on lithium 450mg qd, abilify 30mg qd. s/p pecedex for agitation, required levophed for pressures support  - d/c Klonopin 2mg q12 and Seroquel 25mg qd    Plan:  - Psychiatry consulted  - Will hold Lithium  - Will hold Abilify in setting of poly-antipsychotics  - Start Depakote 250 mg bid for mood stabilization, and titrate to 500 mg bid as tolerated  - Start Latuda 60 mg qd with meal  - Changed Klonopin to 1mg q12h

## 2019-12-10 NOTE — PROGRESS NOTE BEHAVIORAL HEALTH - SUMMARY
Pt is a 53 y/o F with hx of schizoaffective, bipolar type, asthma, diabetes on insulin and metformin, HLD presented to [facility] from [facility] [address] ([phone]) for AMS - exhibiting confusion and agitation, shaking uncontrollably. Pt found to have Li level of 3.27 and required extensive PRN meds for agitation during ED course. Currently Li at 1.17 as of 12/8 @ 6 am. Per collateral from [facility], pt is childish and intellectually impaired at baseline. Poor recall of life events and psych/medical hx. 5-6 prior hospitalizations each lasting about 1 month, unknown locations. Med log at [facility] indicates pt is taking Li  mg tid, Abilify 30 mg qd, Latuda 60 mg qd, and Trazodone 50 mg qd. Pt denies suicidality, AH/VH.    Pt appears cheerful and does not exhibit acute dulce, depression, or psychosis. Pt does not report seeing OP psychiatrist, unclear who is prescribing Li.     - Will hold Li   - Will hold Abilify in setting of poly-antipsychotics  - Stop Depakote as pt has developed severe hyponatremia on it as per Dr. Hernandez, her last psychiatrist  - increase Latuda to 80 mg qd with meal for bipolar   klonopin 1 mg q12h for dulce  -[facility] to [facility] as pt not able to care for herself without further psychiatric stabilization

## 2019-12-10 NOTE — PROGRESS NOTE ADULT - ASSESSMENT
Pt is a 55 y/o woman with bipolar disorder type who presented with AMS likely 2/2 to lithium toxicity. Course complicated by over corrected hyponatremia. Dispo planning and pending re-instation of home meds on [facility] with psychiatry vs discharge back to [facility].

## 2019-12-10 NOTE — CHART NOTE - NSCHARTNOTEFT_GEN_A_CORE
53 y/o F with hx of schizoaffective, bipolar type, asthma, diabetes on insulin and metformin, HLD presented to [facility] from [facility] [address] ([phone]) for AMS - exhibiting confusion and agitation, shaking uncontrollably. Pt found to have Li level of 3.27, admitted to medicine for management. Now medically cleared, patient transferred as involuntary admission to psychiatry  due to inability to care for self and for medication management.    On arrival to [facility] patient is calm, cooperative. Nursing reports no specific issues at this time. Continuing plan per CL team recs.

## 2019-12-10 NOTE — DISCHARGE NOTE PROVIDER - NSDCMRMEDTOKEN_GEN_ALL_CORE_FT
Abilify 30 mg oral tablet: 1 tab(s) orally once a day  Aspirin Enteric Coated 81 mg oral delayed release tablet: 1 tab(s) orally once a day  enoxaparin: 40 milligram(s) subcutaneous once a day  folic acid 1 mg oral tablet: 1 tab(s) orally once a day  insulin glargine: 20 unit(s) subcutaneous once a day (at bedtime)  KlonoPIN 2 mg oral tablet: 1 tab(s) orally 2 times a day, As Needed  Latuda 60 mg oral tablet: 1 tab(s) orally once a day (at bedtime)  lithium carbonate extended release: 450 milligram(s) orally once a day  Multiple Vitamins oral tablet: 1 tab(s) orally once a day  omeprazole 20 mg oral delayed release capsule: 1 cap(s) orally once a day  simvastatin 20 mg oral tablet: 1 tab(s) orally once a day  triamcinolone 0.1% topical lotion: Apply topically to affected area 3 times a day, As Needed  Vitamin B12 1000 mcg oral tablet: 1 tab(s) orally once a day  vitamin d: 48102 unit(s) orally once a week albuterol 90 mcg/inh inhalation aerosol: 2 puff(s) inhaled every 6 hours, As needed, Shortness of Breath and/or Wheezing  aspirin 81 mg oral delayed release tablet: 1 tab(s) orally once a day  cyanocobalamin 1000 mcg oral tablet: 1 tab(s) orally once a day  Dextrose 5% in Water intravenous solution: 1000 milliliter(s) intravenous   enoxaparin: 40 milligram(s) subcutaneous once a day  folic acid 1 mg oral tablet: 1 tab(s) orally once a day  GlucaGen 1 mg injection: 1 milligram(s) injectable once, As Needed for glucose less than 70  glucose 50% intravenous solution: 25 milliliter(s) intravenous once  glucose 50% intravenous solution: 50 milliliter(s) intravenous once  glucose 50% intravenous solution: 50 milliliter(s) intravenous once  insulin glargine: 20 unit(s) subcutaneous once a day (at bedtime)  insulin lispro (concentrated) 200 units/mL subcutaneous solution: 6 unit(s) subcutaneous 3 times a day (before meals)  KlonoPIN 1 mg oral tablet: 1 tab(s) orally every 12 hours  lurasidone 60 mg oral tablet: 1 tab(s) orally once a day  Multiple Vitamins oral tablet: 1 tab(s) orally once a day  petrolatum topical ointment: 1 application topically 2 times a day  polyethylene glycol 3350 oral powder for reconstitution: 17 gram(s) orally once a day  senna oral tablet: 1 tab(s) orally once a day  simvastatin 20 mg oral tablet: 1 tab(s) orally once a day

## 2019-12-10 NOTE — DISCHARGE NOTE NURSING/CASE MANAGEMENT/SOCIAL WORK - PATIENT PORTAL LINK FT
You can access the FollowMyHealth Patient Portal offered by [facility] by registering at the following website: [email or web address]. By joining [employer]’s FollowMyHealth portal, you will also be able to view your health information using other applications (apps) compatible with our system.

## 2019-12-11 DIAGNOSIS — F31.9 BIPOLAR DISORDER, UNSPECIFIED: ICD-10-CM

## 2019-12-11 PROBLEM — E78.5 HYPERLIPIDEMIA, UNSPECIFIED: Chronic | Status: ACTIVE | Noted: 2019-12-06

## 2019-12-11 PROBLEM — E11.9 TYPE 2 DIABETES MELLITUS WITHOUT COMPLICATIONS: Chronic | Status: ACTIVE | Noted: 2019-12-06

## 2019-12-11 PROBLEM — J45.909 UNSPECIFIED ASTHMA, UNCOMPLICATED: Chronic | Status: ACTIVE | Noted: 2019-12-06

## 2019-12-11 PROBLEM — F99 MENTAL DISORDER, NOT OTHERWISE SPECIFIED: Chronic | Status: ACTIVE | Noted: 2019-12-06

## 2019-12-11 PROBLEM — R21 RASH AND OTHER NONSPECIFIC SKIN ERUPTION: Chronic | Status: ACTIVE | Noted: 2019-12-06

## 2019-12-11 LAB
ALBUMIN SERPL ELPH-MCNC: 4.1 G/DL — SIGNIFICANT CHANGE UP (ref 3.3–5)
ALP SERPL-CCNC: 143 U/L — HIGH (ref 40–120)
ALT FLD-CCNC: 23 U/L — SIGNIFICANT CHANGE UP (ref 10–45)
ANION GAP SERPL CALC-SCNC: 12 MMOL/L — SIGNIFICANT CHANGE UP (ref 5–17)
APTT BLD: 31.1 SEC — SIGNIFICANT CHANGE UP (ref 27.5–36.3)
AST SERPL-CCNC: 26 U/L — SIGNIFICANT CHANGE UP (ref 10–40)
BASOPHILS # BLD AUTO: 0.02 K/UL — SIGNIFICANT CHANGE UP (ref 0–0.2)
BASOPHILS NFR BLD AUTO: 0.3 % — SIGNIFICANT CHANGE UP (ref 0–2)
BILIRUB SERPL-MCNC: <0.2 MG/DL — SIGNIFICANT CHANGE UP (ref 0.2–1.2)
BUN SERPL-MCNC: 8 MG/DL — SIGNIFICANT CHANGE UP (ref 7–23)
CALCIUM SERPL-MCNC: 9.4 MG/DL — SIGNIFICANT CHANGE UP (ref 8.4–10.5)
CHLORIDE SERPL-SCNC: 98 MMOL/L — SIGNIFICANT CHANGE UP (ref 96–108)
CHOLEST SERPL-MCNC: 140 MG/DL — SIGNIFICANT CHANGE UP (ref 10–199)
CO2 SERPL-SCNC: 30 MMOL/L — SIGNIFICANT CHANGE UP (ref 22–31)
CREAT SERPL-MCNC: 0.88 MG/DL — SIGNIFICANT CHANGE UP (ref 0.5–1.3)
EOSINOPHIL # BLD AUTO: 0.24 K/UL — SIGNIFICANT CHANGE UP (ref 0–0.5)
EOSINOPHIL NFR BLD AUTO: 3.3 % — SIGNIFICANT CHANGE UP (ref 0–6)
GLUCOSE SERPL-MCNC: 185 MG/DL — HIGH (ref 70–99)
HBA1C BLD-MCNC: 9.2 % — HIGH (ref 4–5.6)
HCG SERPL-ACNC: 2 MIU/ML — SIGNIFICANT CHANGE UP
HCT VFR BLD CALC: 36.5 % — SIGNIFICANT CHANGE UP (ref 34.5–45)
HDLC SERPL-MCNC: 49 MG/DL — LOW
HGB BLD-MCNC: 11.1 G/DL — LOW (ref 11.5–15.5)
IMM GRANULOCYTES NFR BLD AUTO: 0.3 % — SIGNIFICANT CHANGE UP (ref 0–1.5)
INR BLD: 0.98 — SIGNIFICANT CHANGE UP (ref 0.88–1.16)
LIPID PNL WITH DIRECT LDL SERPL: 64 MG/DL — SIGNIFICANT CHANGE UP
LYMPHOCYTES # BLD AUTO: 1.7 K/UL — SIGNIFICANT CHANGE UP (ref 1–3.3)
LYMPHOCYTES # BLD AUTO: 23.4 % — SIGNIFICANT CHANGE UP (ref 13–44)
MCHC RBC-ENTMCNC: 28.2 PG — SIGNIFICANT CHANGE UP (ref 27–34)
MCHC RBC-ENTMCNC: 30.4 GM/DL — LOW (ref 32–36)
MCV RBC AUTO: 92.9 FL — SIGNIFICANT CHANGE UP (ref 80–100)
MONOCYTES # BLD AUTO: 0.47 K/UL — SIGNIFICANT CHANGE UP (ref 0–0.9)
MONOCYTES NFR BLD AUTO: 6.5 % — SIGNIFICANT CHANGE UP (ref 2–14)
NEUTROPHILS # BLD AUTO: 4.81 K/UL — SIGNIFICANT CHANGE UP (ref 1.8–7.4)
NEUTROPHILS NFR BLD AUTO: 66.2 % — SIGNIFICANT CHANGE UP (ref 43–77)
NRBC # BLD: 0 /100 WBCS — SIGNIFICANT CHANGE UP (ref 0–0)
PLATELET # BLD AUTO: 328 K/UL — SIGNIFICANT CHANGE UP (ref 150–400)
POTASSIUM SERPL-MCNC: 4.7 MMOL/L — SIGNIFICANT CHANGE UP (ref 3.5–5.3)
POTASSIUM SERPL-SCNC: 4.7 MMOL/L — SIGNIFICANT CHANGE UP (ref 3.5–5.3)
PROT SERPL-MCNC: 6.8 G/DL — SIGNIFICANT CHANGE UP (ref 6–8.3)
PROTHROM AB SERPL-ACNC: 11.1 SEC — SIGNIFICANT CHANGE UP (ref 10–12.9)
RBC # BLD: 3.93 M/UL — SIGNIFICANT CHANGE UP (ref 3.8–5.2)
RBC # FLD: 14.1 % — SIGNIFICANT CHANGE UP (ref 10.3–14.5)
SODIUM SERPL-SCNC: 140 MMOL/L — SIGNIFICANT CHANGE UP (ref 135–145)
TOTAL CHOLESTEROL/HDL RATIO MEASUREMENT: 2.9 RATIO — LOW (ref 3.3–7.1)
TRIGL SERPL-MCNC: 134 MG/DL — SIGNIFICANT CHANGE UP (ref 10–149)
TSH SERPL-MCNC: 3 UIU/ML — SIGNIFICANT CHANGE UP (ref 0.35–4.94)
WBC # BLD: 7.26 K/UL — SIGNIFICANT CHANGE UP (ref 3.8–10.5)
WBC # FLD AUTO: 7.26 K/UL — SIGNIFICANT CHANGE UP (ref 3.8–10.5)

## 2019-12-11 PROCEDURE — 99222 1ST HOSP IP/OBS MODERATE 55: CPT | Mod: GC

## 2019-12-11 PROCEDURE — 93010 ELECTROCARDIOGRAM REPORT: CPT

## 2019-12-11 PROCEDURE — 99223 1ST HOSP IP/OBS HIGH 75: CPT

## 2019-12-11 RX ORDER — INSULIN LISPRO 100/ML
10 VIAL (ML) SUBCUTANEOUS
Refills: 0 | Status: DISCONTINUED | OUTPATIENT
Start: 2019-12-11 | End: 2019-12-13

## 2019-12-11 RX ORDER — BENZTROPINE MESYLATE 1 MG
0.5 TABLET ORAL
Refills: 0 | Status: DISCONTINUED | OUTPATIENT
Start: 2019-12-11 | End: 2019-12-19

## 2019-12-11 RX ORDER — OLANZAPINE 15 MG/1
10 TABLET, FILM COATED ORAL AT BEDTIME
Refills: 0 | Status: DISCONTINUED | OUTPATIENT
Start: 2019-12-11 | End: 2019-12-16

## 2019-12-11 RX ORDER — METFORMIN HYDROCHLORIDE 850 MG/1
500 TABLET ORAL
Refills: 0 | Status: DISCONTINUED | OUTPATIENT
Start: 2019-12-11 | End: 2019-12-12

## 2019-12-11 RX ADMIN — Medication 2: at 07:58

## 2019-12-11 RX ADMIN — SIMVASTATIN 20 MILLIGRAM(S): 20 TABLET, FILM COATED ORAL at 22:14

## 2019-12-11 RX ADMIN — Medication 10 UNIT(S): at 17:43

## 2019-12-11 RX ADMIN — PREGABALIN 1000 MICROGRAM(S): 225 CAPSULE ORAL at 10:04

## 2019-12-11 RX ADMIN — Medication 6 UNIT(S): at 12:28

## 2019-12-11 RX ADMIN — Medication 6 UNIT(S): at 07:59

## 2019-12-11 RX ADMIN — LURASIDONE HYDROCHLORIDE 80 MILLIGRAM(S): 40 TABLET ORAL at 10:05

## 2019-12-11 RX ADMIN — Medication 1 MILLIGRAM(S): at 22:14

## 2019-12-11 RX ADMIN — INSULIN GLARGINE 20 UNIT(S): 100 INJECTION, SOLUTION SUBCUTANEOUS at 22:11

## 2019-12-11 RX ADMIN — Medication 1 MILLIGRAM(S): at 10:04

## 2019-12-11 RX ADMIN — ALBUTEROL 2 PUFF(S): 90 AEROSOL, METERED ORAL at 16:53

## 2019-12-11 RX ADMIN — OLANZAPINE 10 MILLIGRAM(S): 15 TABLET, FILM COATED ORAL at 22:14

## 2019-12-11 RX ADMIN — Medication 1 APPLICATION(S): at 22:15

## 2019-12-11 RX ADMIN — METFORMIN HYDROCHLORIDE 500 MILLIGRAM(S): 850 TABLET ORAL at 18:41

## 2019-12-11 RX ADMIN — Medication 1 APPLICATION(S): at 10:05

## 2019-12-11 RX ADMIN — Medication 4: at 22:13

## 2019-12-11 RX ADMIN — Medication 12: at 12:28

## 2019-12-11 NOTE — BEHAVIORAL HEALTH ASSESSMENT NOTE - DETAILS
mentioned throwing herself on the ground, to hurt her, but no actual suicidal ideation present though. Lithium toxicity. headache mentioned heart beating too fast "?arrythmia" severe psoriasis/multiple bruises over her UEs. Diabetes Lithium toxicity. depakote- hyponatremia

## 2019-12-11 NOTE — BEHAVIORAL HEALTH ASSESSMENT NOTE - NSBHCHARTREVIEWVS_PSY_A_CORE FT
Vital Signs Last 24 Hrs  T(C): 37.2 (11 Dec 2019 09:28), Max: 37.2 (10 Dec 2019 20:45)  T(F): 98.9 (11 Dec 2019 09:28), Max: 98.9 (10 Dec 2019 20:45)  HR: 87 (11 Dec 2019 09:28) (87 - 106)  BP: 142/89 (10 Dec 2019 20:45) (100/68 - 142/89)  BP(mean): --  RR: 20 (11 Dec 2019 09:28) (18 - 20)  SpO2: 100% (11 Dec 2019 09:28) (97% - 100%)

## 2019-12-11 NOTE — BEHAVIORAL HEALTH ASSESSMENT NOTE - SUMMARY
The patient was transferred from the ED, after she reported to the ED with generalized tremulousness, The following history is from the patient's ED chart note: Pt is a 55 y/o F with hx of schizoaffective, bipolar type, asthma, diabetes on insulin and metformin, HLD presented to [facility] from [facility] [address] ([phone]) for AMS - exhibiting confusion and agitation, shaking uncontrollably. Pt found to have Li level of 3.27 and required extensive PRN meds for agitation during ED course. Currently Li at 1.17 as of 12/8 @ 6 am. Per collateral from [facility], pt is childish and intellectually impaired at baseline. Poor recall of life events and psych/medical hx. 5-6 prior hospitalizations each lasting about 1 month, unknown locations. Med log at [facility] indicates pt is taking Li  mg tid, Abilify 30 mg qd, Latuda 60 mg qd, and Trazodone 50 mg qd. Pt denies suicidality, AH/VH. [facility] collateral revealed that the patient had an appointment with [facility] on 12/24. which she was already planning to not attend for it being eleazar arch[facility]ald.  The patient was interviewed this morning, she was non-linear and incoherent about her history, and was often times focused on getting an aftercare appointment at a shelter/housing, she endorsed some low mood and insomnia, and although mentioned being off the Latuda for 6 months wasn't able to provide much more information about her psychiatrist/diagnosis or previous medication, There was a sense of child like demeanour all through the interview and its estimated that the patient might have mild/moderate intellectual deficits, the patient however displayed some degree of lability in affect and given her complains of decreased sleep and irritable/low mood, might be suffering presently from a mood disorder.

## 2019-12-11 NOTE — BEHAVIORAL HEALTH ASSESSMENT NOTE - NS ED BHA MED ROS GENITOURINARY
“You can access the FollowHealth Patient Portal, offered by [facility], by registering with the following website: [email or web address]” No complaints

## 2019-12-11 NOTE — BEHAVIORAL HEALTH ASSESSMENT NOTE - OTHER PAST PSYCHIATRIC HISTORY (INCLUDE DETAILS REGARDING ONSET, COURSE OF ILLNESS, INPATIENT/OUTPATIENT TREATMENT)
Multiple past psych hospitalizations with many at [facility], no suicide attempts, has been on lithium, depakote, abilify, latuda, and others. Was followed by Dr. Maxim Hernandez up until placement at [facility] where meds are administered under supervision.

## 2019-12-11 NOTE — BEHAVIORAL HEALTH ASSESSMENT NOTE - SUICIDE PROTECTIVE FACTORS
Identifies reasons for living/Has future plans/Cultural, spiritual and/or moral attitudes against suicide

## 2019-12-11 NOTE — BEHAVIORAL HEALTH ASSESSMENT NOTE - NSBHADMITCOUNSELOTHER_PSY_A_CORE FT
Discussed unit rules, treatment regimen's rationale and side effects, her thoughts on [facility] and why myself and Dr. Hernandez believed involuntary commitment was necessary.
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
## 2019-12-11 NOTE — BEHAVIORAL HEALTH ASSESSMENT NOTE - DOMICILE TYPE
We were only able to fill 25/90 pills on the rx written on 6/23/17 because that is all we had in stock.  Tried to order remainder for Monday 6/26 but there was a ordering system problem.    By law remainder must be filled within 72 hours (which is today-6/26).  Will not be able to get med in until tomorrow (6/27), which remainder of rx will be void.    Please send new rx for # 65 so we can fill it tomorrow 6/27 when we get the medication in (order was able to go thru today).     Thank you   -Malou Butler, Pharm.D., [facility] [place], [phone]   Shelter

## 2019-12-11 NOTE — BEHAVIORAL HEALTH ASSESSMENT NOTE - CASE SUMMARY
Latuda switched to zyprexa as no heavy hitter mood stabilizer now being used. Pt is AOK with being on unit today. Even voiced the hope she might be on unit at [place] as she has no one to spend the holidays with.

## 2019-12-11 NOTE — BEHAVIORAL HEALTH ASSESSMENT NOTE - NSBHREFER_PSY_A_CORE
----- Message from Jose Luther MD sent at 3/14/2018  9:42 AM CDT -----  Normal-recheck glucose, lipids per protocol  
Called patient and left VM conveying Dr. Luther's result notes. Call with any questions.  
other source...

## 2019-12-11 NOTE — BEHAVIORAL HEALTH ASSESSMENT NOTE - NSBHADMITCOUNSEL_PSY_A_CORE
risks and benefits of treatment options/instructions for management, treatment and follow up/risk factor reduction/other.../client/family/caregiver education/importance of adherence to chosen treatment/prognosis/diagnostic results/impressions and/or recommended studies

## 2019-12-11 NOTE — BEHAVIORAL HEALTH ASSESSMENT NOTE - PROBLEM SELECTOR PLAN 1
-Latuda 90mg. -Latuda 80mg QAM -Olanzapine 10mg HS  - Ativan 2mg QD -Olanzapine 10mg HS  - Ativan 1mg BD -Olanzapine 10mg HS  - klonopin 1mg BID
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
## 2019-12-11 NOTE — BEHAVIORAL HEALTH ASSESSMENT NOTE - NSBHCHARTREVIEWLAB_PSY_A_CORE FT
Complete Blood Count (12.10.19 @ 06:42)    Nucleated RBC: 0 /100 WBCs    WBC Count: 7.77 K/uL    RBC Count: 3.67 M/uL    Hemoglobin: 10.7 g/dL    Hematocrit: 33.8 %    Mean Cell Volume: 92.1 fl    Mean Cell Hemoglobin: 29.2 pg    Mean Cell Hemoglobin Conc: 31.7 gm/dL    Red Cell Distrib Width: 14.4 %    Platelet Count - Automated: 279 K/uL  ?

## 2019-12-12 DIAGNOSIS — R19.7 DIARRHEA, UNSPECIFIED: ICD-10-CM

## 2019-12-12 LAB
CULTURE RESULTS: SIGNIFICANT CHANGE UP
CULTURE RESULTS: SIGNIFICANT CHANGE UP
SPECIMEN SOURCE: SIGNIFICANT CHANGE UP
SPECIMEN SOURCE: SIGNIFICANT CHANGE UP

## 2019-12-12 PROCEDURE — 99232 SBSQ HOSP IP/OBS MODERATE 35: CPT

## 2019-12-12 PROCEDURE — 99231 SBSQ HOSP IP/OBS SF/LOW 25: CPT | Mod: GC

## 2019-12-12 RX ORDER — LOPERAMIDE HCL 2 MG
2 TABLET ORAL
Refills: 0 | Status: DISCONTINUED | OUTPATIENT
Start: 2019-12-12 | End: 2019-12-19

## 2019-12-12 RX ORDER — INSULIN GLARGINE 100 [IU]/ML
30 INJECTION, SOLUTION SUBCUTANEOUS AT BEDTIME
Refills: 0 | Status: DISCONTINUED | OUTPATIENT
Start: 2019-12-12 | End: 2019-12-13

## 2019-12-12 RX ADMIN — OLANZAPINE 10 MILLIGRAM(S): 15 TABLET, FILM COATED ORAL at 21:18

## 2019-12-12 RX ADMIN — Medication 1 MILLIGRAM(S): at 21:18

## 2019-12-12 RX ADMIN — SENNA PLUS 1 TABLET(S): 8.6 TABLET ORAL at 09:53

## 2019-12-12 RX ADMIN — Medication 10 UNIT(S): at 18:49

## 2019-12-12 RX ADMIN — Medication 1 MILLIGRAM(S): at 09:52

## 2019-12-12 RX ADMIN — METFORMIN HYDROCHLORIDE 500 MILLIGRAM(S): 850 TABLET ORAL at 09:56

## 2019-12-12 RX ADMIN — ALBUTEROL 2 PUFF(S): 90 AEROSOL, METERED ORAL at 21:19

## 2019-12-12 RX ADMIN — Medication 8: at 21:18

## 2019-12-12 RX ADMIN — Medication 6: at 18:48

## 2019-12-12 RX ADMIN — SIMVASTATIN 20 MILLIGRAM(S): 20 TABLET, FILM COATED ORAL at 21:18

## 2019-12-12 RX ADMIN — INSULIN GLARGINE 30 UNIT(S): 100 INJECTION, SOLUTION SUBCUTANEOUS at 21:16

## 2019-12-12 RX ADMIN — PREGABALIN 1000 MICROGRAM(S): 225 CAPSULE ORAL at 09:53

## 2019-12-12 NOTE — PROGRESS NOTE BEHAVIORAL HEALTH - NSBHFUPINTERVALHXFT_PSY_A_CORE
Pt. reports sleeping well, but reports that she feels "foggy" and "slow."  She could not elaborate on her mood as she was distracted by trying to obtain her morning medications and breakfast.  She endorses adherence to her medications, and has no complaints at the moment.  Denies SI/HI/AH/VH. Pt. seen, chart reviewed, case discussed with the Interdisciplinary Treatment Team.  As per Nursing Report, pt has been disheveled but cooperative and compliant with medications.  Pt. reports sleeping well, but reports that she feels "foggy" and "slow."  She could not elaborate on her mood as she was distracted by trying to obtain her morning medications and breakfast.  Has no other complaints at the moment.  Denies SI/HI/AH/VH. Pt. seen, chart reviewed, case discussed with the Interdisciplinary Treatment Team.  As per Nursing Report, pt has been disheveled but cooperative and compliant with medications.  Pt. reports sleeping well, but reports that she feels "foggy" and "slow."  She could not elaborate on her mood as she was distracted by trying to obtain her morning medications and breakfast.  Has no other complaint. Later in the day Nursing reported pt has an episode of diarrhea, and pt reported her mood to be "ok."  Denies SI/HI/AH/VH.
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
## 2019-12-12 NOTE — PROGRESS NOTE BEHAVIORAL HEALTH - SUMMARY
The patient was transferred from the ED, after she reported to the ED with generalized tremulousness, The following history is from the patient's ED chart note: Pt is a 55 y/o F with hx of schizoaffective, bipolar type, asthma, diabetes on insulin and metformin, HLD presented to [facility] from [facility] [address] ([phone]) for AMS - exhibiting confusion and agitation, shaking uncontrollably. Pt found to have Li level of 3.27 and required extensive PRN meds for agitation during ED course. Currently Li at 1.17 as of 12/8 @ 6 am. Per collateral from [facility], pt is childish and intellectually impaired at baseline. Poor recall of life events and psych/medical hx. 5-6 prior hospitalizations each lasting about 1 month, unknown locations. Med log at [facility] indicates pt is taking Li  mg tid, Abilify 30 mg qd, Latuda 60 mg qd, and Trazodone 50 mg qd. Pt denies suicidality, AH/VH. [facility] collateral revealed that the patient had an appointment with [facility] on 12/24. which she was already planning to not attend for it being eleazar archibald.  The patient was interviewed this morning, she was non-linear and incoherent about her history, and was often times focused on getting an aftercare appointment at a shelter/housing, she endorsed some low mood and insomnia, and although mentioned being off the Latuda for 6 months wasn't able to provide much more information about her psychiatrist/diagnosis or previous medication, There was a sense of child like demeanor all through the interview and its estimated that the patient might have mild/moderate intellectual deficits, the patient however displayed some degree of lability in affect and given her complains of decreased sleep and irritable/low mood, might be suffering presently from a mood disorder. The patient was transferred from the ED, after she reported to the ED with generalized tremulousness, The following history is from the patient's ED chart note: Pt is a 53 y/o F with hx of schizoaffective, bipolar type, asthma, diabetes on insulin and metformin, HLD presented to [facility] from [facility] [address] ([phone]) for AMS - exhibiting confusion and agitation, shaking uncontrollably. Pt found to have Li level of 3.27 and required extensive PRN meds for agitation during ED course. Currently Li at 1.17 as of 12/8 @ 6 am. Per collateral from [facility], pt is childish and intellectually impaired at baseline. Poor recall of life events and psych/medical hx. 5-6 prior hospitalizations each lasting about 1 month, unknown locations. Med log at [facility] indicates pt is taking Li  mg tid, Abilify 30 mg qd, Latuda 60 mg qd, and Trazodone 50 mg qd. Pt denies suicidality, AH/VH. [facility] collateral revealed that the patient had an appointment with [facility] on 12/24. which she was already planning to not attend for it being eleazar archibald.  The patient was interviewed this morning, she was non-linear and incoherent about her history, and was often times focused on getting an aftercare appointment at a shelter/housing, she endorsed some low mood and insomnia, and although mentioned being off the Latuda for 6 months wasn't able to provide much more information about her psychiatrist/diagnosis or previous medication, There was a sense of child like demeanor all through the interview and its estimated that the patient might have mild/moderate intellectual deficits, the patient however displayed some degree of lability in affect and given her complains of decreased sleep and irritable/low mood, might be suffering presently from a mood disorder.  12/12: Patient has been cooperative and adherent to her medications.  We were unable to reach her  today, but will continue trying to speak with her to get the full story on the patient, as she herself is a poor historian. The patient was transferred from the ED, after she reported to the ED with generalized tremulousness, The following history is from the patient's ED chart note: Pt is a 53 y/o F with hx of schizoaffective, bipolar type, asthma, diabetes on insulin and metformin, HLD presented to [facility] from [facility] [address] ([phone]) for AMS - exhibiting confusion and agitation, shaking uncontrollably. Pt found to have Li level of 3.27 and required extensive PRN meds for agitation during ED course. Currently Li at 1.17 as of 12/8 @ 6 am. Per collateral from [facility], pt is childish and intellectually impaired at baseline. Poor recall of life events and psych/medical hx. 5-6 prior hospitalizations each lasting about 1 month, unknown locations. Med log at [facility] indicates pt is taking Li  mg tid, Abilify 30 mg qd, Latuda 60 mg qd, and Trazodone 50 mg qd. Pt denies suicidality, AH/VH. [facility] collateral revealed that the patient had an appointment with [facility] on 12/24. which she was already planning to not attend for it being eleazar archibald.  The patient was interviewed this morning, she was non-linear and incoherent about her history, and was often times focused on getting an aftercare appointment at a shelter/housing, she endorsed some low mood and insomnia, and although mentioned being off the Latuda for 6 months wasn't able to provide much more information about her psychiatrist/diagnosis or previous medication, There was a sense of child like demeanor all through the interview and its estimated that the patient might have mild/moderate intellectual deficits, the patient however displayed some degree of lability in affect and given her complains of decreased sleep and irritable/low mood, might be suffering presently from a mood disorder.  12/12: Patient has been cooperative and adherent to her medications.  We were unable to reach her  today, but will continue trying to speak with her to get the full story on the patient, as she herself is a poor historian.  Continue meds Olanzapine 10mg HS and Klonopin 1mg BID. As per previous records: The patient was transferred from the ED, after she reported to the ED with generalized tremulousness, The following history is from the patient's ED chart note: Pt is a 55 y/o F with hx of schizoaffective, bipolar type, asthma, diabetes on insulin and metformin, HLD presented to [facility] from [facility] [address] ([phone]) for AMS - exhibiting confusion and agitation, shaking uncontrollably. Pt found to have Li level of 3.27 and required extensive PRN meds for agitation during ED course. Currently Li at 1.17 as of 12/8 @ 6 am. Per collateral from [facility], pt is childish and intellectually impaired at baseline. Poor recall of life events and psych/medical hx. 5-6 prior hospitalizations each lasting about 1 month, unknown locations. Med log at [facility] indicates pt is taking Li  mg tid, Abilify 30 mg qd, Latuda 60 mg qd, and Trazodone 50 mg qd. Pt denies suicidality, AH/VH. [facility] collateral revealed that the patient had an appointment with [facility] on 12/24. which she was already planning to not attend for it being eleazar archibald.  The patient was interviewed this morning, she was non-linear and incoherent about her history, and was often times focused on getting an aftercare appointment at a shelter/housing, she endorsed some low mood and insomnia, and although mentioned being off the Latuda for 6 months wasn't able to provide much more information about her psychiatrist/diagnosis or previous medication, There was a sense of child like demeanor all through the interview and its estimated that the patient might have mild/moderate intellectual deficits, the patient however displayed some degree of lability in affect and given her complains of decreased sleep and irritable/low mood, might be suffering presently from a mood disorder."  12/12: Patient has been cooperative and adherent to her medications.  We were unable to reach her  today, but will continue trying to speak with her to get the full story on the patient, as she herself is a poor historian.  Continue meds Olanzapine 10mg HS and Klonopin 1mg BID.

## 2019-12-12 NOTE — PROGRESS NOTE BEHAVIORAL HEALTH - NSBHFUPADDHPIFT_PSY_A_CORE
According to MsBritany Pavan, the patient was transferred from [facility], a [facility] shelter for mentally ill women, to [facility], [facility], last week.  Ms. Browne reports that Ms. Ring has been largely cooperative, but occasionally labile in the past.  Denies any history of violence, suicidal, or homicidal ideation.  Ms. Browne described Ms. Ring as being consistently alert, but estimated her intelligence as below average.  She also reported an episode of delirium related to low Na levels in Ms. Ring's past, although Ms. Browne could not specify the exact date.  She denies any knowledge or record of the bruises observed on Ms. Ring's forearms during her physical exam.  A medication reconciliation was also performed, which can be seen below.

## 2019-12-12 NOTE — PROGRESS NOTE BEHAVIORAL HEALTH - NSBHADMITIPBHPROVIDER_PSY_A_CORE
voice mail left with Ms. Namita Browne [phone]/yes voice mail left with Ms. Delaney Browne [phone]/yes

## 2019-12-12 NOTE — PROGRESS NOTE BEHAVIORAL HEALTH - NSBHCHARTREVIEWVS_PSY_A_CORE FT
Vital Signs Last 24 Hrs  T(C): 37.2 (11 Dec 2019 09:28), Max: 37.2 (10 Dec 2019 20:45)  T(F): 98.9 (11 Dec 2019 09:28), Max: 98.9 (10 Dec 2019 20:45)  HR: 87 (11 Dec 2019 09:28) (87 - 106)  BP: 142/89 (10 Dec 2019 20:45) (100/68 - 142/89)  BP(mean): --  RR: 20 (11 Dec 2019 09:28) (18 - 20)  SpO2: 100% (11 Dec 2019 09:28) (97% - 100%) Vital Signs Last 24 Hrs  T(C): 36.7 (11 Dec 2019 16:36), Max: 36.7 (11 Dec 2019 16:36)  T(F): 98.1 (11 Dec 2019 16:36), Max: 98.1 (11 Dec 2019 16:36)  HR: 100 (11 Dec 2019 16:36) (100 - 100)  BP: 124/80 (11 Dec 2019 16:36) (124/80 - 124/80)  BP(mean): --  RR: 18 (11 Dec 2019 16:36) (18 - 18)  SpO2: 100% (11 Dec 2019 16:36) (100% - 100%)

## 2019-12-12 NOTE — PROGRESS NOTE BEHAVIORAL HEALTH - DETAILS
headache mentioned heart beating too fast "?arrythmia" severe psoriasis/multiple bruises over her UEs. Diabetes mentioned throwing herself on the ground, to hurt her, but no actual suicidal ideation present though.

## 2019-12-13 DIAGNOSIS — Z78.1 PHYSICAL RESTRAINT STATUS: ICD-10-CM

## 2019-12-13 DIAGNOSIS — D72.829 ELEVATED WHITE BLOOD CELL COUNT, UNSPECIFIED: ICD-10-CM

## 2019-12-13 DIAGNOSIS — T43.591A POISONING BY OTHER ANTIPSYCHOTICS AND NEUROLEPTICS, ACCIDENTAL (UNINTENTIONAL), INITIAL ENCOUNTER: ICD-10-CM

## 2019-12-13 DIAGNOSIS — E66.9 OBESITY, UNSPECIFIED: ICD-10-CM

## 2019-12-13 DIAGNOSIS — E22.2 SYNDROME OF INAPPROPRIATE SECRETION OF ANTIDIURETIC HORMONE: ICD-10-CM

## 2019-12-13 DIAGNOSIS — J45.909 UNSPECIFIED ASTHMA, UNCOMPLICATED: ICD-10-CM

## 2019-12-13 DIAGNOSIS — E11.9 TYPE 2 DIABETES MELLITUS WITHOUT COMPLICATIONS: ICD-10-CM
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
## 2019-12-13 DIAGNOSIS — E87.2 ACIDOSIS: ICD-10-CM

## 2019-12-13 DIAGNOSIS — Z79.4 LONG TERM (CURRENT) USE OF INSULIN: ICD-10-CM

## 2019-12-13 DIAGNOSIS — E78.5 HYPERLIPIDEMIA, UNSPECIFIED: ICD-10-CM

## 2019-12-13 DIAGNOSIS — G92 TOXIC ENCEPHALOPATHY: ICD-10-CM

## 2019-12-13 DIAGNOSIS — L40.9 PSORIASIS, UNSPECIFIED: ICD-10-CM

## 2019-12-13 DIAGNOSIS — N17.9 ACUTE KIDNEY FAILURE, UNSPECIFIED: ICD-10-CM

## 2019-12-13 DIAGNOSIS — D64.89 OTHER SPECIFIED ANEMIAS: ICD-10-CM

## 2019-12-13 DIAGNOSIS — F31.81 BIPOLAR II DISORDER: ICD-10-CM

## 2019-12-13 DIAGNOSIS — I95.2 HYPOTENSION DUE TO DRUGS: ICD-10-CM

## 2019-12-13 PROCEDURE — 99232 SBSQ HOSP IP/OBS MODERATE 35: CPT

## 2019-12-13 PROCEDURE — 99231 SBSQ HOSP IP/OBS SF/LOW 25: CPT | Mod: GC

## 2019-12-13 RX ORDER — INSULIN LISPRO 100/ML
15 VIAL (ML) SUBCUTANEOUS
Refills: 0 | Status: DISCONTINUED | OUTPATIENT
Start: 2019-12-13 | End: 2019-12-16

## 2019-12-13 RX ORDER — KETOCONAZOLE 20 MG/G
1 AEROSOL, FOAM TOPICAL DAILY
Refills: 0 | Status: DISCONTINUED | OUTPATIENT
Start: 2019-12-13 | End: 2019-12-19

## 2019-12-13 RX ORDER — INSULIN GLARGINE 100 [IU]/ML
45 INJECTION, SOLUTION SUBCUTANEOUS AT BEDTIME
Refills: 0 | Status: DISCONTINUED | OUTPATIENT
Start: 2019-12-13 | End: 2019-12-16

## 2019-12-13 RX ADMIN — ALBUTEROL 2 PUFF(S): 90 AEROSOL, METERED ORAL at 18:23

## 2019-12-13 RX ADMIN — Medication 1 APPLICATION(S): at 09:31

## 2019-12-13 RX ADMIN — Medication 4: at 16:32

## 2019-12-13 RX ADMIN — PREGABALIN 1000 MICROGRAM(S): 225 CAPSULE ORAL at 09:32

## 2019-12-13 RX ADMIN — Medication 6: at 09:41

## 2019-12-13 RX ADMIN — Medication 1 MILLIGRAM(S): at 09:32

## 2019-12-13 RX ADMIN — OLANZAPINE 10 MILLIGRAM(S): 15 TABLET, FILM COATED ORAL at 22:24

## 2019-12-13 RX ADMIN — Medication 10 UNIT(S): at 16:32

## 2019-12-13 RX ADMIN — Medication 2: at 11:46

## 2019-12-13 RX ADMIN — INSULIN GLARGINE 45 UNIT(S): 100 INJECTION, SOLUTION SUBCUTANEOUS at 22:24

## 2019-12-13 RX ADMIN — ALBUTEROL 2 PUFF(S): 90 AEROSOL, METERED ORAL at 09:34

## 2019-12-13 RX ADMIN — Medication 1 APPLICATION(S): at 23:01

## 2019-12-13 RX ADMIN — Medication 1 MILLIGRAM(S): at 22:24

## 2019-12-13 RX ADMIN — Medication 8: at 22:26

## 2019-12-13 RX ADMIN — Medication 10 UNIT(S): at 09:39

## 2019-12-13 RX ADMIN — SIMVASTATIN 20 MILLIGRAM(S): 20 TABLET, FILM COATED ORAL at 22:24

## 2019-12-13 RX ADMIN — Medication 10 UNIT(S): at 11:46

## 2019-12-13 RX ADMIN — Medication 1 APPLICATION(S): at 22:24

## 2019-12-13 NOTE — PROGRESS NOTE BEHAVIORAL HEALTH - NSBHFUPINTERVALHXFT_PSY_A_CORE
Pt. seen, chart reviewed, case discussed with the Interdisciplinary Treatment Team.  As per Nursing Report, pt has been disheveled and distressed, but compliant medications.  Pt. reports hearing voices that are telling her "to be quiet."  She feels "bad" and did not feel like talking this morning.  She refused to elaborate on her episode of diarrhea yesterday.  Denies SI/HI/AH/VH.

## 2019-12-13 NOTE — PROGRESS NOTE BEHAVIORAL HEALTH - NSBHCHARTREVIEWVS_PSY_A_CORE FT
Vital Signs Last 24 Hrs  T(C): 37.1 (12 Dec 2019 16:45), Max: 37.2 (12 Dec 2019 10:00)  T(F): 98.8 (12 Dec 2019 16:45), Max: 98.9 (12 Dec 2019 10:00)  HR: 116 (12 Dec 2019 16:45) (99 - 116)  BP: 143/78 (12 Dec 2019 16:45) (119/76 - 143/78)  BP(mean): --  RR: 19 (12 Dec 2019 16:45) (19 - 20)  SpO2: 97% (12 Dec 2019 16:45) (97% - 98%)

## 2019-12-13 NOTE — PROGRESS NOTE BEHAVIORAL HEALTH - DETAILS
headache severe psoriasis/multiple bruises over her UEs. "The voices tell me to be quiet" mentioned throwing herself on the ground, to hurt her, but no actual suicidal ideation present though.

## 2019-12-13 NOTE — PROGRESS NOTE BEHAVIORAL HEALTH - SUMMARY
As per previous records: The patient was transferred from the ED, after she reported to the ED with generalized tremulousness, The following history is from the patient's ED chart note: Pt is a 53 y/o F with hx of schizoaffective, bipolar type, asthma, diabetes on insulin and metformin, HLD presented to [facility] from [facility] [address] ([phone]) for AMS - exhibiting confusion and agitation, shaking uncontrollably. Pt found to have Li level of 3.27 and required extensive PRN meds for agitation during ED course. Currently Li at 1.17 as of 12/8 @ 6 am. Per collateral from [facility], pt is childish and intellectually impaired at baseline. Poor recall of life events and psych/medical hx. 5-6 prior hospitalizations each lasting about 1 month, unknown locations. Med log at [facility] indicates pt is taking Li  mg tid, Abilify 30 mg qd, Latuda 60 mg qd, and Trazodone 50 mg qd. Pt denies suicidality, AH/VH. [facility] collateral revealed that the patient had an appointment with [employer] on 12/24. which she was already planning to not attend for it being eleazar archibald.  The patient was interviewed this morning, she was non-linear and incoherent about her history, and was often times focused on getting an aftercare appointment at a shelter/housing, she endorsed some low mood and insomnia, and although mentioned being off the Latuda for 6 months wasn't able to provide much more information about her psychiatrist/diagnosis or previous medication, There was a sense of child like demeanor all through the interview and its estimated that the patient might have mild/moderate intellectual deficits, the patient however displayed some degree of lability in affect and given her complains of decreased sleep and irritable/low mood, might be suffering presently from a mood disorder."  12/12: Patient has been cooperative and adherent to her medications.  We were unable to reach her  today, but will continue trying to speak with her to get the full story on the patient, as she herself is a poor historian.  Continue meds Olanzapine 10mg HS and Klonopin 1mg BID.  12/13: Despite adherence to her medications patient's mood is worsening, with the new symptom of auditory hallucinations presenting today telling her to "be quiet" and apparently causing her considerable distress.

## 2019-12-14 RX ADMIN — Medication 6: at 17:17

## 2019-12-14 RX ADMIN — Medication 1 APPLICATION(S): at 22:06

## 2019-12-14 RX ADMIN — Medication 650 MILLIGRAM(S): at 07:40

## 2019-12-14 RX ADMIN — Medication 2: at 07:47

## 2019-12-14 RX ADMIN — Medication 15 UNIT(S): at 11:38

## 2019-12-14 RX ADMIN — Medication 1 MILLIGRAM(S): at 10:03

## 2019-12-14 RX ADMIN — Medication 650 MILLIGRAM(S): at 09:06

## 2019-12-14 RX ADMIN — OLANZAPINE 10 MILLIGRAM(S): 15 TABLET, FILM COATED ORAL at 22:06

## 2019-12-14 RX ADMIN — Medication 1 MILLIGRAM(S): at 22:06

## 2019-12-14 RX ADMIN — SIMVASTATIN 20 MILLIGRAM(S): 20 TABLET, FILM COATED ORAL at 22:06

## 2019-12-14 RX ADMIN — Medication 15 UNIT(S): at 07:48

## 2019-12-14 RX ADMIN — Medication 1 APPLICATION(S): at 10:04

## 2019-12-14 RX ADMIN — INSULIN GLARGINE 45 UNIT(S): 100 INJECTION, SOLUTION SUBCUTANEOUS at 22:12

## 2019-12-14 RX ADMIN — Medication 15 UNIT(S): at 17:18

## 2019-12-14 RX ADMIN — Medication 4: at 11:38

## 2019-12-14 RX ADMIN — Medication 1 APPLICATION(S): at 22:45

## 2019-12-14 RX ADMIN — Medication 6: at 22:13

## 2019-12-14 RX ADMIN — Medication 1 APPLICATION(S): at 10:19

## 2019-12-14 RX ADMIN — PREGABALIN 1000 MICROGRAM(S): 225 CAPSULE ORAL at 10:03

## 2019-12-14 RX ADMIN — SENNA PLUS 1 TABLET(S): 8.6 TABLET ORAL at 10:16

## 2019-12-14 RX ADMIN — POLYETHYLENE GLYCOL 3350 17 GRAM(S): 17 POWDER, FOR SOLUTION ORAL at 10:16

## 2019-12-14 RX ADMIN — KETOCONAZOLE 1 APPLICATION(S): 20 AEROSOL, FOAM TOPICAL at 10:05

## 2019-12-15 RX ADMIN — Medication 6: at 17:13

## 2019-12-15 RX ADMIN — Medication 2: at 07:36

## 2019-12-15 RX ADMIN — INSULIN GLARGINE 45 UNIT(S): 100 INJECTION, SOLUTION SUBCUTANEOUS at 21:35

## 2019-12-15 RX ADMIN — Medication 15 UNIT(S): at 17:13

## 2019-12-15 RX ADMIN — Medication 4: at 21:37

## 2019-12-15 RX ADMIN — SIMVASTATIN 20 MILLIGRAM(S): 20 TABLET, FILM COATED ORAL at 21:36

## 2019-12-15 RX ADMIN — Medication 1 MILLIGRAM(S): at 11:09

## 2019-12-15 RX ADMIN — Medication 1 MILLIGRAM(S): at 21:34

## 2019-12-15 RX ADMIN — Medication 1 APPLICATION(S): at 11:11

## 2019-12-15 RX ADMIN — OLANZAPINE 10 MILLIGRAM(S): 15 TABLET, FILM COATED ORAL at 21:34

## 2019-12-15 RX ADMIN — PREGABALIN 1000 MICROGRAM(S): 225 CAPSULE ORAL at 11:09

## 2019-12-15 RX ADMIN — Medication 1 APPLICATION(S): at 21:34

## 2019-12-15 RX ADMIN — SENNA PLUS 1 TABLET(S): 8.6 TABLET ORAL at 11:09

## 2019-12-15 RX ADMIN — KETOCONAZOLE 1 APPLICATION(S): 20 AEROSOL, FOAM TOPICAL at 11:11

## 2019-12-15 RX ADMIN — Medication 50 MILLIGRAM(S): at 21:35

## 2019-12-15 RX ADMIN — ALBUTEROL 2 PUFF(S): 90 AEROSOL, METERED ORAL at 23:33

## 2019-12-15 RX ADMIN — Medication 15 UNIT(S): at 07:35

## 2019-12-16 DIAGNOSIS — L40.9 PSORIASIS, UNSPECIFIED: ICD-10-CM

## 2019-12-16 DIAGNOSIS — E11.65 TYPE 2 DIABETES MELLITUS WITH HYPERGLYCEMIA: ICD-10-CM

## 2019-12-16 PROCEDURE — 99232 SBSQ HOSP IP/OBS MODERATE 35: CPT

## 2019-12-16 PROCEDURE — 99231 SBSQ HOSP IP/OBS SF/LOW 25: CPT | Mod: GC

## 2019-12-16 RX ORDER — OLANZAPINE 15 MG/1
12.5 TABLET, FILM COATED ORAL DAILY
Refills: 0 | Status: DISCONTINUED | OUTPATIENT
Start: 2019-12-16 | End: 2019-12-16

## 2019-12-16 RX ORDER — INSULIN GLARGINE 100 [IU]/ML
52 INJECTION, SOLUTION SUBCUTANEOUS AT BEDTIME
Refills: 0 | Status: DISCONTINUED | OUTPATIENT
Start: 2019-12-16 | End: 2019-12-18

## 2019-12-16 RX ORDER — INSULIN LISPRO 100/ML
18 VIAL (ML) SUBCUTANEOUS
Refills: 0 | Status: DISCONTINUED | OUTPATIENT
Start: 2019-12-16 | End: 2019-12-17

## 2019-12-16 RX ORDER — OLANZAPINE 15 MG/1
12.5 TABLET, FILM COATED ORAL AT BEDTIME
Refills: 0 | Status: DISCONTINUED | OUTPATIENT
Start: 2019-12-16 | End: 2019-12-19

## 2019-12-16 RX ADMIN — SIMVASTATIN 20 MILLIGRAM(S): 20 TABLET, FILM COATED ORAL at 22:42

## 2019-12-16 RX ADMIN — PREGABALIN 1000 MICROGRAM(S): 225 CAPSULE ORAL at 11:58

## 2019-12-16 RX ADMIN — Medication 1 MILLIGRAM(S): at 11:43

## 2019-12-16 RX ADMIN — Medication 4: at 12:24

## 2019-12-16 RX ADMIN — SENNA PLUS 1 TABLET(S): 8.6 TABLET ORAL at 11:58

## 2019-12-16 RX ADMIN — Medication 1 APPLICATION(S): at 11:58

## 2019-12-16 RX ADMIN — INSULIN GLARGINE 52 UNIT(S): 100 INJECTION, SOLUTION SUBCUTANEOUS at 22:50

## 2019-12-16 RX ADMIN — Medication 6: at 18:11

## 2019-12-16 RX ADMIN — Medication 6: at 22:50

## 2019-12-16 RX ADMIN — KETOCONAZOLE 1 APPLICATION(S): 20 AEROSOL, FOAM TOPICAL at 16:13

## 2019-12-16 RX ADMIN — Medication 1 MILLIGRAM(S): at 22:41

## 2019-12-16 RX ADMIN — Medication 1 APPLICATION(S): at 22:50

## 2019-12-16 RX ADMIN — OLANZAPINE 12.5 MILLIGRAM(S): 15 TABLET, FILM COATED ORAL at 22:41

## 2019-12-16 RX ADMIN — POLYETHYLENE GLYCOL 3350 17 GRAM(S): 17 POWDER, FOR SOLUTION ORAL at 11:57

## 2019-12-16 RX ADMIN — Medication 15 UNIT(S): at 12:24

## 2019-12-16 RX ADMIN — Medication 18 UNIT(S): at 17:59

## 2019-12-16 RX ADMIN — Medication 1 APPLICATION(S): at 22:51

## 2019-12-16 NOTE — PROGRESS NOTE BEHAVIORAL HEALTH - NSBHFUPIPCHARTREVFT_PSY_A_CORE
- Persistently elevated blood glucose levels  - Li levels normalized to 1.17 on 12/8 (up from 0.97 on 12/7)

## 2019-12-16 NOTE — PROGRESS NOTE BEHAVIORAL HEALTH - DETAILS
severe psoriasis/multiple bruises over her UEs. mentioned throwing herself on the ground, to hurt her, but no actual suicidal ideation present though.

## 2019-12-16 NOTE — PROGRESS NOTE BEHAVIORAL HEALTH - NSBHCHARTREVIEWVS_PSY_A_CORE FT
Vital Signs Last 24 Hrs  T(C): 36.5 (16 Dec 2019 09:00), Max: 36.5 (16 Dec 2019 09:00)  T(F): 97.7 (16 Dec 2019 09:00), Max: 97.7 (16 Dec 2019 09:00)  HR: 92 (16 Dec 2019 09:00) (92 - 92)  BP: 124/79 (16 Dec 2019 09:00) (124/79 - 124/79)  BP(mean): --  RR: 16 (16 Dec 2019 09:00) (16 - 16)  SpO2: 96% (16 Dec 2019 09:00) (96% - 96%)
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
## 2019-12-16 NOTE — PROGRESS NOTE BEHAVIORAL HEALTH - SUMMARY
As per previous records: The patient was transferred from the ED, after she reported to the ED with generalized tremulousness, The following history is from the patient's ED chart note: Pt is a 55 y/o F with hx of schizoaffective, bipolar type, asthma, diabetes on insulin and metformin, HLD presented to [facility] from [facility] [address] ([phone]) for AMS - exhibiting confusion and agitation, shaking uncontrollably. Pt found to have Li level of 3.27 and required extensive PRN meds for agitation during ED course. Currently Li at 1.17 as of 12/8 @ 6 am. Per collateral from [facility], pt is childish and intellectually impaired at baseline. Poor recall of life events and psych/medical hx. 5-6 prior hospitalizations each lasting about 1 month, unknown locations. Med log at [facility] indicates pt is taking Li  mg tid, Abilify 30 mg qd, Latuda 60 mg qd, and Trazodone 50 mg qd. Pt denies suicidality, AH/VH. [facility] collateral revealed that the patient had an appointment with [facility] on 12/24. which she was already planning to not attend for it being eleazar archibald.  The patient was interviewed this morning, she was non-linear and incoherent about her history, and was often times focused on getting an aftercare appointment at a shelter/housing, she endorsed some low mood and insomnia, and although mentioned being off the Latuda for 6 months wasn't able to provide much more information about her psychiatrist/diagnosis or previous medication, There was a sense of child like demeanor all through the interview and its estimated that the patient might have mild/moderate intellectual deficits, the patient however displayed some degree of lability in affect and given her complains of decreased sleep and irritable/low mood, might be suffering presently from a mood disorder."  12/12: Patient has been cooperative and adherent to her medications.  We were unable to reach her  today, but will continue trying to speak with her to get the full story on the patient, as she herself is a poor historian.  Continue meds Olanzapine 10mg HS and Klonopin 1mg BID.  12/13: Despite adherence to her medications patient's mood is worsening, with the new symptom of auditory hallucinations presenting today telling her to "be quiet" and apparently causing her considerable distress.  12/16: Patient has been cooperative and adherent to her medications.  Her BG levels have been persistently elevated, likely in part d/t patient's noncompliance w/ diet and insulin. Endocrine is following and we will follow their recs.  Increase Olanzapine from 10->12.5 today. As per previous records: The patient was transferred from the ED, after she reported to the ED with generalized tremulousness, The following history is from the patient's ED chart note: Pt is a 53 y/o F with hx of schizoaffective, bipolar type, asthma, diabetes on insulin and metformin, HLD presented to [facility] from [facility] [address] ([phone]) for AMS - exhibiting confusion and agitation, shaking uncontrollably. Pt found to have Li level of 3.27 and required extensive PRN meds for agitation during ED course. Currently Li at 1.17 as of 12/8 @ 6 am. Per collateral from [facility], pt is childish and intellectually impaired at baseline. Poor recall of life events and psych/medical hx. 5-6 prior hospitalizations each lasting about 1 month, unknown locations. Med log at [facility] indicates pt is taking Li  mg tid, Abilify 30 mg qd, Latuda 60 mg qd, and Trazodone 50 mg qd. Pt denies suicidality, AH/VH. [facility] collateral revealed that the patient had an appointment with [facility] on 12/24. which she was already planning to not attend for it being eleazar archibald.  The patient was interviewed this morning, she was non-linear and incoherent about her history, and was often times focused on getting an aftercare appointment at a shelter/housing, she endorsed some low mood and insomnia, and although mentioned being off the Latuda for 6 months wasn't able to provide much more information about her psychiatrist/diagnosis or previous medication, There was a sense of child like demeanor all through the interview and its estimated that the patient might have mild/moderate intellectual deficits, the patient however displayed some degree of lability in affect and given her complains of decreased sleep and irritable/low mood, might be suffering presently from a mood disorder.  12/12: Patient has been cooperative and adherent to her medications.  We were unable to reach her  today, but will continue trying to speak with her to get the full story on the patient, as she herself is a poor historian.  Continue meds Olanzapine 10mg HS and Klonopin 1mg BID.  12/13: Despite adherence to her medications patient's mood is worsening, with the new symptom of auditory hallucinations presenting today telling her to "be quiet" and apparently causing her considerable distress."  12/16: Patient has been cooperative and adherent to her medications.  Her BG levels have been persistently elevated, likely in part d/t patient's noncompliance w/ diet and insulin. Endocrine is following and we will follow their recs.  Increase Olanzapine from 10->12.5 today.

## 2019-12-16 NOTE — PROGRESS NOTE BEHAVIORAL HEALTH - NSBHFUPINTERVALHXFT_PSY_A_CORE
Pt. seen, chart reviewed, case discussed with the Interdisciplinary Treatment Team.  As per Nursing Report, pt has been disheveled and drowsy, but compliant medications.  Pt. reports her mood is improved from last week and she is no longer hearing voices.  She enjoys the food here and endorses adherence to her meds, reporting that she believes that "calm her down" and they are "good for me."  She reports she feels safe on the jain.  Denies SI/HI/AH/VH. Pt. seen, chart reviewed, case discussed with the Interdisciplinary Treatment Team.  As per Nursing Report, pt has been disheveled and drowsy, but compliant with medications.  Pt. reports her mood is improved from last week and she is no longer hearing voices.  She enjoys the food here and endorses adherence to her meds, reporting that she believes that "calm her down" and they are "good for me."  She reports she feels safe on the jain.  Denies SI/HI/AH/VH.

## 2019-12-17 PROCEDURE — 99231 SBSQ HOSP IP/OBS SF/LOW 25: CPT | Mod: GC

## 2019-12-17 PROCEDURE — 99232 SBSQ HOSP IP/OBS MODERATE 35: CPT

## 2019-12-17 RX ORDER — INSULIN LISPRO 100/ML
14 VIAL (ML) SUBCUTANEOUS
Refills: 0 | Status: DISCONTINUED | OUTPATIENT
Start: 2019-12-18 | End: 2019-12-18

## 2019-12-17 RX ORDER — INSULIN LISPRO 100/ML
18 VIAL (ML) SUBCUTANEOUS
Refills: 0 | Status: DISCONTINUED | OUTPATIENT
Start: 2019-12-17 | End: 2019-12-19

## 2019-12-17 RX ORDER — INSULIN LISPRO 100/ML
18 VIAL (ML) SUBCUTANEOUS
Refills: 0 | Status: DISCONTINUED | OUTPATIENT
Start: 2019-12-17 | End: 2019-12-18

## 2019-12-17 RX ORDER — CLONAZEPAM 1 MG
1 TABLET ORAL EVERY 12 HOURS
Refills: 0 | Status: DISCONTINUED | OUTPATIENT
Start: 2019-12-17 | End: 2019-12-19

## 2019-12-17 RX ADMIN — SIMVASTATIN 20 MILLIGRAM(S): 20 TABLET, FILM COATED ORAL at 21:13

## 2019-12-17 RX ADMIN — Medication 18 UNIT(S): at 17:11

## 2019-12-17 RX ADMIN — INSULIN GLARGINE 52 UNIT(S): 100 INJECTION, SOLUTION SUBCUTANEOUS at 21:13

## 2019-12-17 RX ADMIN — PREGABALIN 1000 MICROGRAM(S): 225 CAPSULE ORAL at 10:05

## 2019-12-17 RX ADMIN — OLANZAPINE 12.5 MILLIGRAM(S): 15 TABLET, FILM COATED ORAL at 21:13

## 2019-12-17 RX ADMIN — Medication 4: at 21:14

## 2019-12-17 RX ADMIN — Medication 18 UNIT(S): at 08:00

## 2019-12-17 RX ADMIN — POLYETHYLENE GLYCOL 3350 17 GRAM(S): 17 POWDER, FOR SOLUTION ORAL at 10:08

## 2019-12-17 RX ADMIN — Medication 50 MILLIGRAM(S): at 14:47

## 2019-12-17 RX ADMIN — Medication 1 MILLIGRAM(S): at 10:05

## 2019-12-17 RX ADMIN — SENNA PLUS 1 TABLET(S): 8.6 TABLET ORAL at 10:05

## 2019-12-17 RX ADMIN — Medication 650 MILLIGRAM(S): at 22:17

## 2019-12-17 RX ADMIN — Medication 1 APPLICATION(S): at 21:13

## 2019-12-17 RX ADMIN — Medication 650 MILLIGRAM(S): at 23:00

## 2019-12-17 RX ADMIN — Medication 18 UNIT(S): at 12:46

## 2019-12-17 RX ADMIN — Medication 1 APPLICATION(S): at 21:30

## 2019-12-17 RX ADMIN — Medication 1 APPLICATION(S): at 10:09

## 2019-12-17 RX ADMIN — HALOPERIDOL DECANOATE 5 MILLIGRAM(S): 100 INJECTION INTRAMUSCULAR at 14:47

## 2019-12-17 RX ADMIN — ALBUTEROL 2 PUFF(S): 90 AEROSOL, METERED ORAL at 21:18

## 2019-12-17 RX ADMIN — Medication 1 MILLIGRAM(S): at 21:13

## 2019-12-17 NOTE — PROGRESS NOTE BEHAVIORAL HEALTH - NSBHADDHXSUBSTFT_PSY_A_CORE
Used EtOH and cocaine briefly in her 20s

## 2019-12-17 NOTE — PROGRESS NOTE BEHAVIORAL HEALTH - NSBHADDHXPSYCHFT_PSY_A_CORE
Patient was diagnosed w/ Schizoaffective disorder

## 2019-12-17 NOTE — PROGRESS NOTE BEHAVIORAL HEALTH - SUMMARY
As per previous records: The patient was transferred from the ED, after she reported to the ED with generalized tremulousness, The following history is from the patient's ED chart note: Pt is a 55 y/o F with hx of schizoaffective, bipolar type, asthma, diabetes on insulin and metformin, HLD presented to [facility] from [facility] [address] ([phone]) for AMS - exhibiting confusion and agitation, shaking uncontrollably. Pt found to have Li level of 3.27 and required extensive PRN meds for agitation during ED course. Currently Li at 1.17 as of 12/8 @ 6 am. Per collateral from [facility], pt is childish and intellectually impaired at baseline. Poor recall of life events and psych/medical hx. 5-6 prior hospitalizations each lasting about 1 month, unknown locations. Med log at [facility] indicates pt is taking Li  mg tid, Abilify 30 mg qd, Latuda 60 mg qd, and Trazodone 50 mg qd. Pt denies suicidality, AH/VH. [facility] collateral revealed that the patient had an appointment with [facility] on 12/24. which she was already planning to not attend for it being eleazar archibald.  The patient was interviewed this morning, she was non-linear and incoherent about her history, and was often times focused on getting an aftercare appointment at a shelter/housing, she endorsed some low mood and insomnia, and although mentioned being off the Latuda for 6 months wasn't able to provide much more information about her psychiatrist/diagnosis or previous medication, There was a sense of child like demeanor all through the interview and its estimated that the patient might have mild/moderate intellectual deficits, the patient however displayed some degree of lability in affect and given her complains of decreased sleep and irritable/low mood, might be suffering presently from a mood disorder.  12/12: Patient has been cooperative and adherent to her medications.  We were unable to reach her  today, but will continue trying to speak with her to get the full story on the patient, as she herself is a poor historian.  Continue meds Olanzapine 10mg HS and Klonopin 1mg BID.  12/13: Despite adherence to her medications patient's mood is worsening, with the new symptom of auditory hallucinations presenting today telling her to "be quiet" and apparently causing her considerable distress."  12/16: Patient has been cooperative and adherent to her medications.  Her BG levels have been persistently elevated, likely in part d/t patient's noncompliance w/ diet and insulin. Endocrine is following and we will follow their recs.  Increase Olanzapine from 10->12.5 today.  12/17: As per previous records: The patient was transferred from the ED, after she reported to the ED with generalized tremulousness, The following history is from the patient's ED chart note: Pt is a 55 y/o F with hx of schizoaffective, bipolar type, asthma, diabetes on insulin and metformin, HLD presented to [facility] from [facility] [address] ([phone]) for AMS - exhibiting confusion and agitation, shaking uncontrollably. Pt found to have Li level of 3.27 and required extensive PRN meds for agitation during ED course. Currently Li at 1.17 as of 12/8 @ 6 am. Per collateral from [facility], pt is childish and intellectually impaired at baseline. Poor recall of life events and psych/medical hx. 5-6 prior hospitalizations each lasting about 1 month, unknown locations. Med log at [facility] indicates pt is taking Li  mg tid, Abilify 30 mg qd, Latuda 60 mg qd, and Trazodone 50 mg qd. Pt denies suicidality, AH/VH. [facility] collateral revealed that the patient had an appointment with [facility] on 12/24. which she was already planning to not attend for it being eleazar archibald.  The patient was interviewed this morning, she was non-linear and incoherent about her history, and was often times focused on getting an aftercare appointment at a shelter/housing, she endorsed some low mood and insomnia, and although mentioned being off the Latuda for 6 months wasn't able to provide much more information about her psychiatrist/diagnosis or previous medication, There was a sense of child like demeanor all through the interview and its estimated that the patient might have mild/moderate intellectual deficits, the patient however displayed some degree of lability in affect and given her complains of decreased sleep and irritable/low mood, might be suffering presently from a mood disorder.  12/12: Patient has been cooperative and adherent to her medications.  We were unable to reach her  today, but will continue trying to speak with her to get the full story on the patient, as she herself is a poor historian.  Continue meds Olanzapine 10mg HS and Klonopin 1mg BID.  12/13: Despite adherence to her medications patient's mood is worsening, with the new symptom of auditory hallucinations presenting today telling her to "be quiet" and apparently causing her considerable distress."  12/16: Patient has been cooperative and adherent to her medications.  Her BG levels have been persistently elevated, likely in part d/t patient's noncompliance w/ diet and insulin. Endocrine is following and we will follow their recs.  Increase Olanzapine from 10->12.5 today.  12/17: Patient has been cooperative and adherent to her medications.  Her BG levels have been persistently elevated and endocrine was consulted and is following.  Continue Olanzapine 12.5mg. As per previous records: The patient was transferred from the ED, after she reported to the ED with generalized tremulousness, The following history is from the patient's ED chart note: Pt is a 53 y/o F with hx of schizoaffective, bipolar type, asthma, diabetes on insulin and metformin, HLD presented to [facility] from [facility] [address] ([phone]) for AMS - exhibiting confusion and agitation, shaking uncontrollably. Pt found to have Li level of 3.27 and required extensive PRN meds for agitation during ED course. Currently Li at 1.17 as of 12/8 @ 6 am. Per collateral from [facility], pt is childish and intellectually impaired at baseline. Poor recall of life events and psych/medical hx. 5-6 prior hospitalizations each lasting about 1 month, unknown locations. Med log at [facility] indicates pt is taking Li  mg tid, Abilify 30 mg qd, Latuda 60 mg qd, and Trazodone 50 mg qd. Pt denies suicidality, AH/VH. [facility] collateral revealed that the patient had an appointment with [facility] on 12/24. which she was already planning to not attend for it being eleazar archibald.  The patient was interviewed this morning, she was non-linear and incoherent about her history, and was often times focused on getting an aftercare appointment at a shelter/housing, she endorsed some low mood and insomnia, and although mentioned being off the Latuda for 6 months wasn't able to provide much more information about her psychiatrist/diagnosis or previous medication, There was a sense of child like demeanor all through the interview and its estimated that the patient might have mild/moderate intellectual deficits, the patient however displayed some degree of lability in affect and given her complains of decreased sleep and irritable/low mood, might be suffering presently from a mood disorder.  12/12: Patient has been cooperative and adherent to her medications.  We were unable to reach her  today, but will continue trying to speak with her to get the full story on the patient, as she herself is a poor historian.  Continue meds Olanzapine 10mg HS and Klonopin 1mg BID.  12/13: Despite adherence to her medications patient's mood is worsening, with the new symptom of auditory hallucinations presenting today telling her to "be quiet" and apparently causing her considerable distress.  12/16: Patient has been cooperative and adherent to her medications.  Her BG levels have been persistently elevated, likely in part d/t patient's noncompliance w/ diet and insulin. Endocrine is following and we will follow their recs.  Increase Olanzapine from 10->12.5 today."  12/17: Patient has been cooperative and adherent to her medications.  Her BG levels have been persistently elevated and endocrine was consulted and is following.  Continue Olanzapine 12.5mg.

## 2019-12-17 NOTE — PROGRESS NOTE BEHAVIORAL HEALTH - PROBLEM SELECTOR PLAN 3
- Endocrine is following; will follow their recs - Endocrine is following; will follow their recs; increased insulin glargine to 52 units in at bedtime on 12/16;

## 2019-12-17 NOTE — PROGRESS NOTE BEHAVIORAL HEALTH - PROFESSIONAL COLLATERAL RELATIONSHIP
at [facility]
 at [facility]
 at [facility]
 at [facility]
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
## 2019-12-17 NOTE — PROGRESS NOTE BEHAVIORAL HEALTH - VIOLENCE PROTECTIVE FACTORS:
Residential stability/Engagement in treatment
Residential stability/Engagement in treatment
Engagement in treatment/Residential stability
Residential stability/Engagement in treatment

## 2019-12-17 NOTE — PROGRESS NOTE BEHAVIORAL HEALTH - NSBHFUPINTERVALHXFT_PSY_A_CORE
Pt. seen, chart reviewed, case discussed with the Interdisciplinary Treatment Team.  As per Nursing Report, pt has been disheveled and drowsy, but compliant with medications.  Pt. reports her mood is improved from last week and she is no longer hearing voices.  She enjoys the food here and endorses adherence to her meds, reporting that she believes that "calm her down" and they are "good for me."  She reports she feels safe on the jain.  Denies SI/HI/AH/VH. Pt. seen sleeping in bed and was difficult to arouse, chart reviewed, case discussed with the Interdisciplinary Treatment Team.  As per Nursing Report, pt has been disheveled and drowsy, but compliant with medications.  Pt. reports she is tired but that her mood is improved from last week and she is no longer hearing voices.  She endorses adherence to her meds, reporting that she took her medication last night and is tired and sleep because of it.  She reports she feels safe on the jain.  Denies SI/HI/AH/VH. Pt. seen, chart reviewed, case discussed with the Interdisciplinary Treatment Team.  As per Nursing Report, pt has been disheveled and drowsy, but compliant with medications.  Pt. reports she is tired but that her mood is improved from last week and she is no longer hearing voices.  She endorses adherence to her meds, reporting that she took her medication last night and is tired and sleep because of it.  She reports she feels safe on the jain.  Denies SI/HI/AH/VH.

## 2019-12-17 NOTE — PROGRESS NOTE BEHAVIORAL HEALTH - NSBHFUPSTRENGTHS_PSY_A_CORE
Cooperative with treatment

## 2019-12-17 NOTE — PROGRESS NOTE BEHAVIORAL HEALTH - PROBLEM SELECTOR PLAN 1
-Increase Olanzapine 10mg ->12.5mg qHS  - Klonopin 1mg BID - Continue Olanzapine 12.5mg qHS  - Klonopin 1mg BID

## 2019-12-18 PROCEDURE — 99233 SBSQ HOSP IP/OBS HIGH 50: CPT

## 2019-12-18 PROCEDURE — 99231 SBSQ HOSP IP/OBS SF/LOW 25: CPT | Mod: GC

## 2019-12-18 RX ORDER — INSULIN LISPRO 100/ML
16 VIAL (ML) SUBCUTANEOUS
Refills: 0 | Status: DISCONTINUED | OUTPATIENT
Start: 2019-12-18 | End: 2019-12-19

## 2019-12-18 RX ORDER — INSULIN LISPRO 100/ML
20 VIAL (ML) SUBCUTANEOUS
Refills: 0 | Status: DISCONTINUED | OUTPATIENT
Start: 2019-12-18 | End: 2019-12-19

## 2019-12-18 RX ORDER — INSULIN GLARGINE 100 [IU]/ML
45 INJECTION, SOLUTION SUBCUTANEOUS AT BEDTIME
Refills: 0 | Status: DISCONTINUED | OUTPATIENT
Start: 2019-12-18 | End: 2019-12-19

## 2019-12-18 RX ADMIN — Medication 6: at 17:34

## 2019-12-18 RX ADMIN — SIMVASTATIN 20 MILLIGRAM(S): 20 TABLET, FILM COATED ORAL at 21:41

## 2019-12-18 RX ADMIN — ALBUTEROL 2 PUFF(S): 90 AEROSOL, METERED ORAL at 21:57

## 2019-12-18 RX ADMIN — PREGABALIN 1000 MICROGRAM(S): 225 CAPSULE ORAL at 10:20

## 2019-12-18 RX ADMIN — Medication 2: at 12:11

## 2019-12-18 RX ADMIN — Medication 1 APPLICATION(S): at 10:25

## 2019-12-18 RX ADMIN — SENNA PLUS 1 TABLET(S): 8.6 TABLET ORAL at 10:18

## 2019-12-18 RX ADMIN — Medication 20 UNIT(S): at 17:34

## 2019-12-18 RX ADMIN — Medication 14 UNIT(S): at 07:56

## 2019-12-18 RX ADMIN — Medication 6: at 21:42

## 2019-12-18 RX ADMIN — INSULIN GLARGINE 45 UNIT(S): 100 INJECTION, SOLUTION SUBCUTANEOUS at 21:41

## 2019-12-18 RX ADMIN — OLANZAPINE 12.5 MILLIGRAM(S): 15 TABLET, FILM COATED ORAL at 21:41

## 2019-12-18 RX ADMIN — Medication 18 UNIT(S): at 12:11

## 2019-12-18 RX ADMIN — KETOCONAZOLE 1 APPLICATION(S): 20 AEROSOL, FOAM TOPICAL at 10:23

## 2019-12-18 RX ADMIN — Medication 1 MILLIGRAM(S): at 10:18

## 2019-12-18 RX ADMIN — Medication 1 APPLICATION(S): at 21:41

## 2019-12-18 RX ADMIN — Medication 1 MILLIGRAM(S): at 21:43

## 2019-12-18 RX ADMIN — ALBUTEROL 2 PUFF(S): 90 AEROSOL, METERED ORAL at 10:25

## 2019-12-18 RX ADMIN — Medication 1 APPLICATION(S): at 10:23

## 2019-12-18 RX ADMIN — POLYETHYLENE GLYCOL 3350 17 GRAM(S): 17 POWDER, FOR SOLUTION ORAL at 10:23

## 2019-12-18 NOTE — PROGRESS NOTE BEHAVIORAL HEALTH - CASE SUMMARY
Pt looks much improved. Clearer and faster cognition. For discharge back to [facility] tomorrow with outpatient follow up at [facility] on 12/24.
Pt continues to improve. Denied psychotic sx to me. Sleepinga lot. Psoriasis flares. Pt fine with being in the hospital at this time. Will continue to uptitrate zyprexa for psychosis and mood stabilization.
54F pph Bipolar D/o appears to be responding well to current medications with no side effects reported or observed. Risks, benefits, and potential side effects of all medications discussed with patient. Current risk of self-harm due to mood episode will be mitigated by use of medication i.e. Zyprexa. Inpatient Hospitalization is necessary at this time to ensure pt safety, stabilize symptoms, and plan a safe discharge.
54F pph Schizoaffective D/o responding well to current medications with no side effects reported or observed. Risks, benefits, and potential side effects of all medications discussed with patient. Current risk of self-harm due to mood episode will be mitigated by use of medication i.e. Zyprexa. Inpatient Hospitalization is necessary at this time to ensure pt safety, stabilize symptoms, and plan a safe discharge.
54F pph Bipolar D/o appears to be responding well to current medications with no side effects reported or observed. Risks, benefits, and potential side effects of all medications discussed with patient. Current risk of self-harm due to mood episode will be mitigated by use of medication i.e. Zyprexa. Inpatient Hospitalization is necessary at this time to ensure pt safety, stabilize symptoms, and plan a safe discharge.

## 2019-12-18 NOTE — PROGRESS NOTE BEHAVIORAL HEALTH - PROBLEM SELECTOR PLAN 3
- Endocrine is following; will follow their recs; increased insulin glargine to 52 units in at bedtime on 12/16;

## 2019-12-18 NOTE — PROGRESS NOTE BEHAVIORAL HEALTH - NSBHADMITCOUNSELOTHER_PSY_A_CORE FT
Discussed discharge planning including where she will follow up and where [facility] is. Discussed her understanding of what had occurred that led to her lithium intoxication and delirium.

## 2019-12-18 NOTE — PROGRESS NOTE BEHAVIORAL HEALTH - NSBHCHARTREVIEWVS_PSY_A_CORE FT
Vital Signs Last 24 Hrs  T(C): 37 (18 Dec 2019 09:00), Max: 37 (17 Dec 2019 15:59)  T(F): 98.6 (18 Dec 2019 09:00), Max: 98.6 (17 Dec 2019 15:59)  HR: 87 (18 Dec 2019 09:00) (87 - 91)  BP: 99/64 (18 Dec 2019 09:00) (98/60 - 99/64)  BP(mean): --  RR: 16 (18 Dec 2019 09:00) (16 - 18)  SpO2: 99% (18 Dec 2019 09:00) (96% - 99%)

## 2019-12-18 NOTE — PROGRESS NOTE BEHAVIORAL HEALTH - NSBHFUPINTERVALHXFT_PSY_A_CORE
The patient was seen this morning, she was pleasant and mentioned that she was attending the groups as well as taking her medications, the patient denied the presence of any suicidal ideas/impulses/thoughts and expressed wishes of going back to her residence facility. The patient was also discussed th The patient was seen this morning, she was pleasant and mentioned that she was attending the groups as well as taking her medications, the patient denied the presence of any suicidal ideas/impulses/thoughts and expressed wishes of going back to her residence facility. The patient also denied any perceptual disorder/paranoia. The patient was also discussed in the morning meeting and it was unanimously concluded that she would be discharged to her residence tomorrow.

## 2019-12-18 NOTE — PROGRESS NOTE BEHAVIORAL HEALTH - SUMMARY
As per previous records: The patient was transferred from the ED, after she reported to the ED with generalized tremulousness, The following history is from the patient's ED chart note: Pt is a 55 y/o F with hx of schizoaffective, bipolar type, asthma, diabetes on insulin and metformin, HLD presented to [facility] from [facility] [address] ([phone]) for AMS - exhibiting confusion and agitation, shaking uncontrollably. Pt found to have Li level of 3.27 and required extensive PRN meds for agitation during ED course. Currently Li at 1.17 as of 12/8 @ 6 am. Per collateral from [facility], pt is childish and intellectually impaired at baseline. Poor recall of life events and psych/medical hx. 5-6 prior hospitalizations each lasting about 1 month, unknown locations. Med log at [facility] indicates pt is taking Li  mg tid, Abilify 30 mg qd, Latuda 60 mg qd, and Trazodone 50 mg qd. Pt denies suicidality, AH/VH. [facility] collateral revealed that the patient had an appointment with [employer] [employer] on 12/24. which she was already planning to not attend for it being eleazar archibald.  The patient was interviewed this morning, she was non-linear and incoherent about her history, and was often times focused on getting an aftercare appointment at a shelter/housing, she endorsed some low mood and insomnia, and although mentioned being off the Latuda for 6 months wasn't able to provide much more information about her psychiatrist/diagnosis or previous medication, There was a sense of child like demeanor all through the interview and its estimated that the patient might have mild/moderate intellectual deficits, the patient however displayed some degree of lability in affect and given her complains of decreased sleep and irritable/low mood, might be suffering presently from a mood disorder.  12/12: Patient has been cooperative and adherent to her medications.  We were unable to reach her  today, but will continue trying to speak with her to get the full story on the patient, as she herself is a poor historian.  Continue meds Olanzapine 10mg HS and Klonopin 1mg BID.  12/13: Despite adherence to her medications patient's mood is worsening, with the new symptom of auditory hallucinations presenting today telling her to "be quiet" and apparently causing her considerable distress.  12/16: Patient has been cooperative and adherent to her medications.  Her BG levels have been persistently elevated, likely in part d/t patient's noncompliance w/ diet and insulin. Endocrine is following and we will follow their recs.  Increase Olanzapine from 10->12.5 today."  12/17: Patient has been cooperative and adherent to her medications.  Her BG levels have been persistently elevated and endocrine was consulted and is following.  Continue Olanzapine 12.5mg.  12/18: Patient compliant with medications, groups and is coherent about her aftercare plans. Presently discharge focused, denied any suicidal ideation/impulse/thoughts, Probable discharge tomorrow.

## 2019-12-18 NOTE — PROGRESS NOTE BEHAVIORAL HEALTH - NSBHADMITCOUNSEL_PSY_A_CORE
client/family/caregiver education/prognosis/diagnostic results/impressions and/or recommended studies/risks and benefits of treatment options/instructions for management, treatment and follow up/risk factor reduction/other.../importance of adherence to chosen treatment
risks and benefits of treatment options/client/family/caregiver education/prognosis/diagnostic results/impressions and/or recommended studies/importance of adherence to chosen treatment/instructions for management, treatment and follow up/risk factor reduction
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
## 2019-12-19 VITALS
RESPIRATION RATE: 16 BRPM | HEART RATE: 84 BPM | OXYGEN SATURATION: 98 % | DIASTOLIC BLOOD PRESSURE: 66 MMHG | TEMPERATURE: 98 F | SYSTOLIC BLOOD PRESSURE: 89 MMHG

## 2019-12-19 PROCEDURE — 84702 CHORIONIC GONADOTROPIN TEST: CPT

## 2019-12-19 PROCEDURE — 84443 ASSAY THYROID STIM HORMONE: CPT

## 2019-12-19 PROCEDURE — 36415 COLL VENOUS BLD VENIPUNCTURE: CPT

## 2019-12-19 PROCEDURE — 94640 AIRWAY INHALATION TREATMENT: CPT

## 2019-12-19 PROCEDURE — 80053 COMPREHEN METABOLIC PANEL: CPT

## 2019-12-19 PROCEDURE — 83036 HEMOGLOBIN GLYCOSYLATED A1C: CPT

## 2019-12-19 PROCEDURE — 85025 COMPLETE CBC W/AUTO DIFF WBC: CPT

## 2019-12-19 PROCEDURE — 85730 THROMBOPLASTIN TIME PARTIAL: CPT

## 2019-12-19 PROCEDURE — 82962 GLUCOSE BLOOD TEST: CPT

## 2019-12-19 PROCEDURE — 99238 HOSP IP/OBS DSCHRG MGMT 30/<: CPT

## 2019-12-19 PROCEDURE — 93005 ELECTROCARDIOGRAM TRACING: CPT

## 2019-12-19 PROCEDURE — 80061 LIPID PANEL: CPT

## 2019-12-19 PROCEDURE — 85610 PROTHROMBIN TIME: CPT

## 2019-12-19 RX ORDER — OLANZAPINE 15 MG/1
5 TABLET, FILM COATED ORAL
Qty: 70 | Refills: 0
Start: 2019-12-19 | End: 2020-01-01

## 2019-12-19 RX ORDER — SIMVASTATIN 20 MG/1
1 TABLET, FILM COATED ORAL
Qty: 0 | Refills: 0 | DISCHARGE

## 2019-12-19 RX ORDER — PREGABALIN 225 MG/1
1 CAPSULE ORAL
Qty: 14 | Refills: 0
Start: 2019-12-19 | End: 2020-01-01

## 2019-12-19 RX ORDER — CLONAZEPAM 1 MG
1 TABLET ORAL
Qty: 28 | Refills: 0
Start: 2019-12-19 | End: 2020-01-01

## 2019-12-19 RX ORDER — SIMVASTATIN 20 MG/1
1 TABLET, FILM COATED ORAL
Qty: 14 | Refills: 0
Start: 2019-12-19 | End: 2020-01-01

## 2019-12-19 RX ORDER — FOLIC ACID 0.8 MG
1 TABLET ORAL
Qty: 14 | Refills: 0
Start: 2019-12-19 | End: 2020-01-01

## 2019-12-19 RX ADMIN — SENNA PLUS 1 TABLET(S): 8.6 TABLET ORAL at 09:35

## 2019-12-19 RX ADMIN — Medication 16 UNIT(S): at 08:15

## 2019-12-19 RX ADMIN — Medication 1 MILLIGRAM(S): at 09:35

## 2019-12-19 RX ADMIN — PREGABALIN 1000 MICROGRAM(S): 225 CAPSULE ORAL at 09:35

## 2019-12-19 NOTE — PROGRESS NOTE BEHAVIORAL HEALTH - NSBHFUPINTERVALHXFT_PSY_A_CORE
Pt. seen, chart reviewed, case discussed with the Interdisciplinary Treatment Team. As per Nursing Report, pt has been calm, cooperative, and compliant with medications.  Pt. seen individually, she is standing in the maloney, dressed in her own clothing. Pt. reports to be feeling “good.” She has been eating and sleeping well.  Pt. denies SI/HI/AH/VH. No side effects to medications reported or observed. Pt. is looking forward to returning home this afternoon and spending [place] next week with her co-residents.

## 2019-12-19 NOTE — PROGRESS NOTE BEHAVIORAL HEALTH - NSBHCHARTREVIEWINVESTIGATE_PSY_A_CORE FT
< from: 12 Lead ECG (12.11.19 @ 13:45) >      Ventricular Rate 97 BPM    Atrial Rate 97 BPM    P-R Interval 152 ms    QRS Duration 74 ms    Q-T Interval 344 ms    QTC Calculation(Bezet) 436 ms    P Axis 67 degrees    R Axis 79 degrees    T Axis 62 degrees    Diagnosis Line Normal sinus rhythm  EKG within normal limits      Confirmed by ALLISON JULIEN NEIL ([id number]) on 12/11/2019 4:09:41 PM

## 2019-12-19 NOTE — PROGRESS NOTE BEHAVIORAL HEALTH - NSBHCHARTREVIEWIMAGING_PSY_A_CORE FT
EXAM:  CT BRAIN                           PROCEDURE DATE:  12/06/2019          INTERPRETATION:  PROCEDURE: CT brain without intravenous contrast    INDICATION: Altered mental status    TECHNIQUE: Multiple axial images were obtained at 5 mm intervals from the   skull base to the vertex. Sagittal and coronal reformatted images were   obtained from the axial data set. The images were reviewed in brain and   bone windows.    COMPARISON: None    FINDINGS: The CT examination demonstrates the ventricles, cisternal   spaces, and cortical sulci to be within normal limits. There is no   midline shift or extra axial collections. The gray white differentiation   appears within normal limits. There is no intracranial hemorrhage or   acute transcortical infarct. The bony windows demonstrates no fractures.   There is a small to moderate-sized retention cyst within the left   maxillary sinus. The rest of the visualized paranasal sinuses are within   normal limits. The mastoid air cells are well aerated.    IMPRESSION: No intracranial hemorrhage or acute transcortical infarct.
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
## 2019-12-19 NOTE — PROGRESS NOTE BEHAVIORAL HEALTH - NSBHATTESTSEENBY_PSY_A_CORE
attending Psychiatrist without NP/Trainee
Attending Psychiatrist supervising NP/Trainee, meeting pt...

## 2019-12-19 NOTE — PROGRESS NOTE BEHAVIORAL HEALTH - PROBLEM SELECTOR PLAN 2
Pt. may be safely discharged to the community to follow up with Outpatient providers.
- loperamide 2mg BID prn

## 2019-12-19 NOTE — PROGRESS NOTE BEHAVIORAL HEALTH - NS ED BHA MSE GENERAL APPEARANCE
No deformities present/Well developed
No deformities present/Well developed
Well developed/No deformities present
No deformities present/Well developed

## 2019-12-19 NOTE — PROGRESS NOTE BEHAVIORAL HEALTH - NSBHCHARTREVIEWLAB_PSY_A_CORE FT
Lithium Level, Serum (12.08.19 @ 06:36)    Lithium Level, Serum: 1.17 mmoL/L  Lithium Level, Serum (12.07.19 @ 12:06)    Lithium Level, Serum: .97 mmoL/L  Lithium Level, Serum (12.07.19 @ 01:19)    Lithium Level, Serum: 2.32: TYPE:(C=Critical, N=Notification, A=Abnormal)   Lithium Level, Serum (12.06.19 @ 15:44)    Lithium Level, Serum: 3.27: Test Repeated  Critical value mmoL/L    Hemoglobin A1C, Whole Blood in AM (12.11.19 @ 07:26)    Hemoglobin A1C, Whole Blood: 9.2    Lipid Profile in AM (12.11.19 @ 07:26)    Total Cholesterol/HDL Ratio Measurement: 2.9 RATIO    Cholesterol, Serum: 140 mg/dL    Triglycerides, Serum: 134 mg/dL    HDL Cholesterol, Serum: 49    Comprehensive Metabolic Panel (12.11.19 @ 16:49)    Sodium, Serum: 140    Potassium, Serum: 4.7 mmol/L    Chloride, Serum: 98 mmol/L    Carbon Dioxide, Serum: 30 mmol/L    Anion Gap, Serum: 12 mmol/L    Blood Urea Nitrogen, Serum: 8 mg/dL    Creatinine, Serum: 0.88 mg/dL    Glucose, Serum: 185 mg/dL    Calcium, Total Serum: 9.4 mg/dL    Protein Total, Serum: 6.8 g/dL    Albumin, Serum: 4.1 g/dL    Bilirubin Total, Serum: <0.2 mg/dL    Alkaline Phosphatase, Serum: 143 U/L    Aspartate Aminotransferase (AST/SGOT): 26 U/L    Alanine Aminotransferase (ALT/SGPT): 23 U/L    Complete Blood Count + Automated Diff (12.11.19 @ 16:50)    WBC Count: 7.26 K/uL    RBC Count: 3.93 M/uL    Hemoglobin: 11.1 g/dL    Hematocrit: 36.5 %    Mean Cell Volume: 92.9 fl    Mean Cell Hemoglobin: 28.2 pg    Mean Cell Hemoglobin Conc: 30.4 gm/dL    Red Cell Distrib Width: 14.1 %    Platelet Count - Automated: 328 K/uL

## 2019-12-19 NOTE — PROGRESS NOTE BEHAVIORAL HEALTH - NSBHADMITIPBHPROVFT_PSY_A_CORE
Residence contacted.
Left a voicemail at 10:45 a.m.

## 2019-12-19 NOTE — PROGRESS NOTE BEHAVIORAL HEALTH - OTHER
has a lurch in her step

## 2019-12-19 NOTE — PROGRESS NOTE BEHAVIORAL HEALTH - PROBLEM SELECTOR PROBLEM 1
Bipolar 1 disorder

## 2019-12-19 NOTE — PROGRESS NOTE BEHAVIORAL HEALTH - NSBHADMITIPREASON_PSY_A_CORE
Danger to self; mental illness expected to respond to inpatient care
other reason
Danger to self; mental illness expected to respond to inpatient care

## 2019-12-19 NOTE — PROGRESS NOTE BEHAVIORAL HEALTH - PROBLEM SELECTOR PLAN 4
Pt. may be safely discharged to the community to follow up with Outpatient providers.
- Derm saw on 12/13 and rx'd clobetasol 0.05% ointment PRN and ketoconazole shampoo 2%; continue to follow recs

## 2019-12-19 NOTE — PROGRESS NOTE BEHAVIORAL HEALTH - PROBLEM/PLAN-4
I will STOP taking the medications listed below when I get home from the hospital:  None
DISPLAY PLAN FREE TEXT

## 2019-12-19 NOTE — PROGRESS NOTE BEHAVIORAL HEALTH - SUMMARY
As per previous records: The patient was transferred from the ED, after she reported to the ED with generalized tremulousness, The following history is from the patient's ED chart note: Pt is a 53 y/o F with hx of schizoaffective, bipolar type, asthma, diabetes on insulin and metformin, HLD presented to [facility] from [facility] [address] ([phone]) for AMS - exhibiting confusion and agitation, shaking uncontrollably. Pt found to have Li level of 3.27 and required extensive PRN meds for agitation during ED course. Currently Li at 1.17 as of 12/8 @ 6 am. Per collateral from [facility], pt is childish and intellectually impaired at baseline. Poor recall of life events and psych/medical hx. 5-6 prior hospitalizations each lasting about 1 month, unknown locations. Med log at [facility] indicates pt is taking Li  mg tid, Abilify 30 mg qd, Latuda 60 mg qd, and Trazodone 50 mg qd. Pt denies suicidality, AH/VH. [facility] collateral revealed that the patient had an appointment with [facility] on 12/24. which she was already planning to not attend for it being eleazar archibald.  The patient was interviewed this morning, she was non-linear and incoherent about her history, and was often times focused on getting an aftercare appointment at a shelter/housing, she endorsed some low mood and insomnia, and although mentioned being off the Latuda for 6 months wasn't able to provide much more information about her psychiatrist/diagnosis or previous medication, There was a sense of child like demeanor all through the interview and its estimated that the patient might have mild/moderate intellectual deficits, the patient however displayed some degree of lability in affect and given her complains of decreased sleep and irritable/low mood, might be suffering presently from a mood disorder.  12/12: Patient has been cooperative and adherent to her medications.  We were unable to reach her  today, but will continue trying to speak with her to get the full story on the patient, as she herself is a poor historian.  Continue meds Olanzapine 10mg HS and Klonopin 1mg BID.  12/13: Despite adherence to her medications patient's mood is worsening, with the new symptom of auditory hallucinations presenting today telling her to "be quiet" and apparently causing her considerable distress.  12/16: Patient has been cooperative and adherent to her medications.  Her BG levels have been persistently elevated, likely in part d/t patient's noncompliance w/ diet and insulin. Endocrine is following and we will follow their recs.  Increase Olanzapine from 10->12.5 today.  12/17: Patient has been cooperative and adherent to her medications.  Her BG levels have been persistently elevated and endocrine was consulted and is following.  Continue Olanzapine 12.5mg.  12/18: Patient compliant with medications, groups and is coherent about her aftercare plans. Presently discharge focused, denied any suicidal ideation/impulse/thoughts, Probable discharge tomorrow."  12/19: Pt. may be safely discharged to the community to follow up with Outpatient providers.

## 2019-12-19 NOTE — PROGRESS NOTE BEHAVIORAL HEALTH - PERCEPTIONS
No abnormalities
Auditory hallucinations
No abnormalities
No abnormalities

## 2019-12-24 DIAGNOSIS — T43.595A ADVERSE EFFECT OF OTHER ANTIPSYCHOTICS AND NEUROLEPTICS, INITIAL ENCOUNTER: ICD-10-CM

## 2019-12-24 DIAGNOSIS — L29.8 OTHER PRURITUS: ICD-10-CM

## 2019-12-24 DIAGNOSIS — J45.909 UNSPECIFIED ASTHMA, UNCOMPLICATED: ICD-10-CM

## 2019-12-24 DIAGNOSIS — E11.65 TYPE 2 DIABETES MELLITUS WITH HYPERGLYCEMIA: ICD-10-CM

## 2019-12-24 DIAGNOSIS — F25.0 SCHIZOAFFECTIVE DISORDER, BIPOLAR TYPE: ICD-10-CM

## 2019-12-24 DIAGNOSIS — E78.5 HYPERLIPIDEMIA, UNSPECIFIED: ICD-10-CM

## 2019-12-24 DIAGNOSIS — E46 UNSPECIFIED PROTEIN-CALORIE MALNUTRITION: ICD-10-CM

## 2019-12-24 DIAGNOSIS — L40.9 PSORIASIS, UNSPECIFIED: ICD-10-CM

## 2019-12-24 DIAGNOSIS — R19.7 DIARRHEA, UNSPECIFIED: ICD-10-CM

## 2019-12-24 DIAGNOSIS — F29 UNSPECIFIED PSYCHOSIS NOT DUE TO A SUBSTANCE OR KNOWN PHYSIOLOGICAL CONDITION: ICD-10-CM

## 2020-03-11 NOTE — PROGRESS NOTE BEHAVIORAL HEALTH - NSBHADMITNEWMED_PSY_A_CORE
Physical Therapy Daily Treatment  \"Pushpa\" is pt's mom and .    Visit Count: 19    Plan of Care: 12/18/2019 Through: 4/8/2020  Insurance Information: 52 visits per year  Referred by: Yanci Mercer MD; Next provider visit (if known/scheduled): TBD   Medical Diagnosis (from order):       Diagnosis Information             Diagnosis      431 (ICD-9-CM) - I61.9 (ICD-10-CM) - Stroke, hemorrhagic (CMS/HCC)      342.90 (ICD-9-CM) - G81.94 (ICD-10-CM) - Left hemiplegia (CMS/HCC)                  Treatment Diagnosis: hemiplegic symptoms with increased pain/symptoms, impaired posture, impaired strength, impaired gait, impaired mobility, impaired activity tolerance     Date of onset of stroke: 5/27/19  Diagnosis Precautions: Skin sensitivity/reaction to adhesive per pt  Chart reviewed at time of initial evaluation (relevant co-morbidities, allergies, tests and medications listed): HTN, obesity, (R) CVA     SUBJECTIVE   No new concerns or c/o. OT prior to session today.   Current Pain (0-10 scale): no c/o  Functional Change:     OBJECTIVE   Hand Dominance: right;  (Left hand nonfunctional)     Range of Motion: LE    Left Right Synergy  Involved  Left Left Left   Date Initial Initial Initial 1/2/20  1/13/2020 2/3/2020 3/5/2020   HIP     none noted          Flexion WNL WNL            Extension WNL WNL            Abduction NT NT          KNEE     none noted          Flexion WNL WNL            Extension WNL WNL          ANKLE     Yes           Dorsiflexion Limited WNL   Supine AROM: -30  Supine PROM: -15 (after tone relaxes)  Supine PROM - 8 degrees from neutral Supine PROM: 3 degrees into DF  Supine PROM: 3     Plantar        flexion  WNL WNL      PROM 25      Inversion  WNL WNL      PROM 35      Eversion Limited WNL      PROM 12    [standard testing positions unless otherwise noted]  Comments: measurement: ; motion PROM; WFL=within functional limits* denotes pain     Strength: Lower Extremities    Left Right 
  Date Initial Initial   HIP         Flexion 4-/5 5/5     Extension not tested not tested     Abduction 4/5 5/5     Glut Medius 4/5 4+/5     Adduction  4/5 4+/5     Internal Rotation 4-/5 4+/5     External Rotation 4/5 4+/5   KNEE         Flexion 4-/5 5/5     Extension 4-/5 5/5   ANKLE         Dorsiflexion 3/5 5/5     Plantar flexion 3+/5 5/5     Inversion 3/5 5/5     Eversion 0/5 5/5   [standard testing positions unless otherwise noted]  Comments: None; * denotes pain     Mobility:  Task 2/24/2020 Status   Bed Mobility     Transfers  Modified Indep with arm rest of chair Right   Car Transfer     Toilet Transfer     Floor Transfer   Mod Assist Modified with use of chair   Household Amb  Modified Indep with LBQC   Community Amb     Stairs 6 inch  1 rail, min Assist    Curb 6 inch  MinAssist/CGA with LBQC       6 Minute Walk Test: (6MWT) 2/24/2020  Assistive Device used: Quad Cane  Total distance walked in feet: 329 feet  Number of rest stops: None  Norms:  All age/gender: 6011-8494 feet  60-69 years: male 1074-8813 feet,     Treatment   Gait belt on during entire training session.    Neuromuscular Reeducation of movement, balance, coordination, kinesthetic sense, posture, and proprioception.  Patient instructed in proper technique, muscle control, balance, alignment, sequencing, and dynamic stabilization patterns using verbal, visual and tactile cues for each of the following:  · Quadruped alt arm/leg lifts on therapy mat  · Tall kneeling push/pull with (L) upper extremity.  · Hip abd, and hip abd with IR // bars with standby to contact guard assist, cone touch activities with right lower extremity --> step and holds first with // bar support then to SPC  · Stepping to 4\" step with R , with rail support  · AROM ankle eversion against manual resistance -- reps to fatigue     Gait  -Ambulation through clinic area with SPC -- cues for L weight shift with step through R.         Skilled input: verbal instruction/cues, 
tactile instruction/cues, facilitation, as detailed above    Home Program:   Continuing inpatient HEP as follows per pt:   Seated LAQ with orange band 10x with 5 sec hold 1-2x/day   Seated hamstring curl with orange band 10x with 5 sec hold 1-2x/day   SLR in supine 10-12, 1-2x/d   Hip abduction in supine 10-12, 1-2x/d   Supine Bridging 10-14, 1-2/day   Supine or seated Glut sets 10-14, 1-2/day   Walking in home  Seated soleus stretch (focus on self positioning in correct alignment and maintaining during) hold 2 minutes progress to goal of 5 minutes 3x/day*  May hold off doing until rail installed: Standing gastroc stretch -focus on self positioning left LE in correct stretch position alignment with active glut and quad contraction during with heel on ground hold 30 sec x 4. Do 2x/day.*    · Right Sidelying: active lateral trunk flexion via left LE lower lift 10x 1  · Right Sidelying: active lateral trunk flexion via rasing upper trunk/body 10 x 1  · Right Sidelying: active lateral trunk flexion via upperbody and lower leg lift 10x1  · Prone left knee flexion 20x  · Prone left hip extension 10 x 3    Writer verbally educated the patient and received verbal consent from the patient on hand placement, positioning of patient, and techniques to be performed today including clothing adjustments for techniques, therapist position for techniques, hand placement and palpation for techniques as described above and how they are pertinent to the patient's plan of care.      Suggestions for next session as indicated: progress per plan of care,  - Measure Left ankle DF PROM and update new goal addition.  - Primary Physical Therapist (Mario) indicates pt may benefit from a FES stimulation consult and he will look into it.  -[place] with OT approx 1x/week as able (OT and primary PT agree) to focus on  4 point, tall kneeling, 1/2 kneeling activity, floor to stand recovery; and step up/down with use of UE weight bearing/depression on 
rail/platform type support with TRX support, use of physioball support/tchair with arm rest gait belt (this has not been set up by pt/pt's mother as of yet, both are aware of this plan)  -Gait training: progress to single cane on level and on curb; on steps 1 rail (left UE no assistive device due to left hand not functionally able to  rail or assistive device)  - Work on improving weight shift to (L) LE and Left LE stability during stance phase of gait as well as correcting pt excessive left hip abd,ER Left LE during swing phase of gait.     ASSESSMENT   Patient continues to walk with greater ease. Need to contact  about FES walk aid trial.   Pain after treatment (patient reported, 0-10 scale): Pt denies any worsening pain of shoulder or ankle foot or anywhere after treatment today.  Result of above outlined education: Verbalizes understanding, Demonstrates understanding and Needs reinforcement     Goals: To be obtained by end of this plan of care:  Goal status Updated on 2/24/2020  6. Patient independent with modified and progressed home exercise program.  MET for current HEP with good compliance with HEP; 6x/week at minimum per pt statement  7. Patient to have CHAMORRO score of 45/56 to reduce falls risk.  Not Tested due to pt using a LBQC (assistive device) to ambulate.  8. Patient to ambulate 350' feet in the 6 minute walk test for independence in community ambulation with single point cane.  On 2/24/2020 pt able to ambulate 329 feet with quad cane modified Indep (CGA of gait belt on for safety) demonstrating significant progress towards goal..  9. Patient to perform floor transfers using appropriate furniture as support, Modified Independent.   Progress towards with use of stabilized chair and assist X1   10. Patient to perform stair climbing for 12 stairs with 1 side rail(s), Modified Independent.  Very good progress towards able to perform with 1 rail (no assistive device in left hand due to inability 
to  cane with left hand) and min Assist for foot placement and to guard for balance  (assist x 2 utilized for safety given pt's size).  11. Patient to ambulate Modified Independent over various surfaces/obstacles of level surfaces, unlevel surfaces, grass, curb, carpeting for safety at home and community.   Progressing toward goal  12. Patient will negotiate a curb with Modified Independent with single point cane.  Progressing towards goal; up/down 1curb, 6 inch height with LBQC.  13. New Goal Addition 2/24/20: Pt's left ankle DF will be at least >=10 degrees of dorsiflexion for ROM WFL for gait.    (On 2/24/2020 goals updated to Modified Independent vs Independent as pt uses an assistive device and original goals set to use assistive device)    Procedures and total treatment time documented in Time Entry flowsheet.  
no

## 2020-06-22 NOTE — DIETITIAN INITIAL EVALUATION ADULT. - WEIGHT CHANGE
yes Sski Pregnancy And Lactation Text: This medication is Pregnancy Category D and isn't considered safe during pregnancy. It is excreted in breast milk.

## 2020-07-24 ENCOUNTER — EMERGENCY (EMERGENCY)
Facility: HOSPITAL | Age: 55
LOS: 1 days | Discharge: HOME | End: 2020-07-24
Attending: EMERGENCY MEDICINE | Admitting: EMERGENCY MEDICINE
Payer: MEDICAID

## 2020-07-24 VITALS
SYSTOLIC BLOOD PRESSURE: 107 MMHG | RESPIRATION RATE: 14 BRPM | HEIGHT: 62 IN | TEMPERATURE: 98 F | DIASTOLIC BLOOD PRESSURE: 74 MMHG | OXYGEN SATURATION: 97 % | WEIGHT: 190.92 LBS | HEART RATE: 150 BPM

## 2020-07-24 LAB
ALBUMIN SERPL ELPH-MCNC: 4.1 G/DL — SIGNIFICANT CHANGE UP (ref 3.4–5)
ALP SERPL-CCNC: 86 U/L — SIGNIFICANT CHANGE UP (ref 40–120)
ALT FLD-CCNC: 34 U/L — SIGNIFICANT CHANGE UP (ref 12–42)
ANION GAP SERPL CALC-SCNC: 14 MMOL/L — SIGNIFICANT CHANGE UP (ref 9–16)
APAP SERPL-MCNC: <2 UG/ML — LOW (ref 10–30)
AST SERPL-CCNC: 34 U/L — SIGNIFICANT CHANGE UP (ref 15–37)
BASOPHILS # BLD AUTO: 0.03 K/UL — SIGNIFICANT CHANGE UP (ref 0–0.2)
BASOPHILS NFR BLD AUTO: 0.3 % — SIGNIFICANT CHANGE UP (ref 0–2)
BILIRUB SERPL-MCNC: 0.4 MG/DL — SIGNIFICANT CHANGE UP (ref 0.2–1.2)
BUN SERPL-MCNC: 17 MG/DL — SIGNIFICANT CHANGE UP (ref 7–23)
CALCIUM SERPL-MCNC: 10 MG/DL — SIGNIFICANT CHANGE UP (ref 8.5–10.5)
CHLORIDE SERPL-SCNC: 91 MMOL/L — LOW (ref 96–108)
CO2 SERPL-SCNC: 26 MMOL/L — SIGNIFICANT CHANGE UP (ref 22–31)
CREAT SERPL-MCNC: 1.31 MG/DL — HIGH (ref 0.5–1.3)
EOSINOPHIL # BLD AUTO: 0.03 K/UL — SIGNIFICANT CHANGE UP (ref 0–0.5)
EOSINOPHIL NFR BLD AUTO: 0.3 % — SIGNIFICANT CHANGE UP (ref 0–6)
GLUCOSE SERPL-MCNC: 325 MG/DL — HIGH (ref 70–99)
HCT VFR BLD CALC: 38 % — SIGNIFICANT CHANGE UP (ref 34.5–45)
HGB BLD-MCNC: 13.4 G/DL — SIGNIFICANT CHANGE UP (ref 11.5–15.5)
IMM GRANULOCYTES NFR BLD AUTO: 0.3 % — SIGNIFICANT CHANGE UP (ref 0–1.5)
LYMPHOCYTES # BLD AUTO: 1.84 K/UL — SIGNIFICANT CHANGE UP (ref 1–3.3)
LYMPHOCYTES # BLD AUTO: 19.8 % — SIGNIFICANT CHANGE UP (ref 13–44)
MCHC RBC-ENTMCNC: 28.3 PG — SIGNIFICANT CHANGE UP (ref 27–34)
MCHC RBC-ENTMCNC: 35.3 GM/DL — SIGNIFICANT CHANGE UP (ref 32–36)
MCV RBC AUTO: 80.2 FL — SIGNIFICANT CHANGE UP (ref 80–100)
MONOCYTES # BLD AUTO: 0.81 K/UL — SIGNIFICANT CHANGE UP (ref 0–0.9)
MONOCYTES NFR BLD AUTO: 8.7 % — SIGNIFICANT CHANGE UP (ref 2–14)
NEUTROPHILS # BLD AUTO: 6.55 K/UL — SIGNIFICANT CHANGE UP (ref 1.8–7.4)
NEUTROPHILS NFR BLD AUTO: 70.6 % — SIGNIFICANT CHANGE UP (ref 43–77)
NRBC # BLD: 0 /100 WBCS — SIGNIFICANT CHANGE UP (ref 0–0)
PLATELET # BLD AUTO: 417 K/UL — HIGH (ref 150–400)
POTASSIUM SERPL-MCNC: 3.4 MMOL/L — LOW (ref 3.5–5.3)
POTASSIUM SERPL-SCNC: 3.4 MMOL/L — LOW (ref 3.5–5.3)
PROT SERPL-MCNC: 8.3 G/DL — HIGH (ref 6.4–8.2)
RBC # BLD: 4.74 M/UL — SIGNIFICANT CHANGE UP (ref 3.8–5.2)
RBC # FLD: 12.9 % — SIGNIFICANT CHANGE UP (ref 10.3–14.5)
SALICYLATES SERPL-MCNC: 1.3 MG/DL — LOW (ref 2.8–20)
SODIUM SERPL-SCNC: 131 MMOL/L — LOW (ref 132–145)
WBC # BLD: 9.29 K/UL — SIGNIFICANT CHANGE UP (ref 3.8–10.5)
WBC # FLD AUTO: 9.29 K/UL — SIGNIFICANT CHANGE UP (ref 3.8–10.5)

## 2020-07-24 PROCEDURE — 93010 ELECTROCARDIOGRAM REPORT: CPT

## 2020-07-24 PROCEDURE — 99285 EMERGENCY DEPT VISIT HI MDM: CPT

## 2020-07-24 NOTE — ED ADULT NURSE NOTE - OBJECTIVE STATEMENT
55y female presents to ED c/o suicidal ideation. Pt states, "I need to be in the psych hospital. If you make me leave here, I will kill myself." Pt states she was kicked out of shelter this AM and while walking in the street felt paranoid delusions of everyone laughing at her and locking doors. Pt endorses auditory hallucinations saying she hears voices but won't specify what they say. Pt endorses hx of cutting wrists, cocaine use, and schizophrenia. Takes risperidone, and klonipin. Hx of angina. Denies alcohol use, HI. Placed on constant observation.

## 2020-07-24 NOTE — ED ADULT NURSE REASSESSMENT NOTE - DESCRIPTION
talking to self, stating "I don't want any water" even though currently nothing is being offered to her. remains tearful. flight of ideas- refusing water and fearful of "men going to kill her."

## 2020-07-24 NOTE — ED ADULT TRIAGE NOTE - CHIEF COMPLAINT QUOTE
Patient presents to the ED complaining of " I need to be admitted, I am going to kill myself if you let me go". Notes + visual and auditory hallucination. Admits using cocaine with last use 2 weeks ago. Had history of cutting wrist in the past.

## 2020-07-24 NOTE — ED ADULT NURSE NOTE - NSIMPLEMENTINTERV_GEN_ALL_ED
Implemented All Universal Safety Interventions:  [place] to call system. Call bell, personal items and telephone within reach. Instruct patient to call for assistance. Room bathroom lighting operational. Non-slip footwear when patient is off stretcher. Physically safe environment: no spills, clutter or unnecessary equipment. Stretcher in lowest position, wheels locked, appropriate side rails in place.

## 2020-07-25 VITALS
HEART RATE: 104 BPM | SYSTOLIC BLOOD PRESSURE: 117 MMHG | RESPIRATION RATE: 14 BRPM | OXYGEN SATURATION: 98 % | DIASTOLIC BLOOD PRESSURE: 79 MMHG | TEMPERATURE: 98 F

## 2020-07-25 DIAGNOSIS — F25.0 SCHIZOAFFECTIVE DISORDER, BIPOLAR TYPE: ICD-10-CM

## 2020-07-25 LAB
APPEARANCE UR: CLEAR — SIGNIFICANT CHANGE UP
BILIRUB UR-MCNC: NEGATIVE — SIGNIFICANT CHANGE UP
COLOR SPEC: YELLOW — SIGNIFICANT CHANGE UP
DIFF PNL FLD: NEGATIVE — SIGNIFICANT CHANGE UP
ETHANOL SERPL-MCNC: <3 MG/DL — SIGNIFICANT CHANGE UP
GLUCOSE UR QL: >=1000
KETONES UR-MCNC: ABNORMAL MG/DL
LEUKOCYTE ESTERASE UR-ACNC: NEGATIVE — SIGNIFICANT CHANGE UP
NITRITE UR-MCNC: NEGATIVE — SIGNIFICANT CHANGE UP
PCP SPEC-MCNC: SIGNIFICANT CHANGE UP
PH UR: 6 — SIGNIFICANT CHANGE UP (ref 5–8)
PROT UR-MCNC: ABNORMAL MG/DL
SP GR SPEC: 1.02 — SIGNIFICANT CHANGE UP (ref 1–1.03)
UROBILINOGEN FLD QL: 0.2 E.U./DL — SIGNIFICANT CHANGE UP

## 2020-07-25 PROCEDURE — 90792 PSYCH DIAG EVAL W/MED SRVCS: CPT | Mod: 95

## 2020-07-25 RX ORDER — SODIUM CHLORIDE 9 MG/ML
1000 INJECTION INTRAMUSCULAR; INTRAVENOUS; SUBCUTANEOUS ONCE
Refills: 0 | Status: COMPLETED | OUTPATIENT
Start: 2020-07-25 | End: 2020-07-25

## 2020-07-25 RX ORDER — POTASSIUM CHLORIDE 20 MEQ
40 PACKET (EA) ORAL ONCE
Refills: 0 | Status: COMPLETED | OUTPATIENT
Start: 2020-07-25 | End: 2020-07-25

## 2020-07-25 RX ADMIN — SODIUM CHLORIDE 1000 MILLILITER(S): 9 INJECTION INTRAMUSCULAR; INTRAVENOUS; SUBCUTANEOUS at 05:21

## 2020-07-25 RX ADMIN — Medication 40 MILLIEQUIVALENT(S): at 05:20

## 2020-07-25 RX ADMIN — SODIUM CHLORIDE 1000 MILLILITER(S): 9 INJECTION INTRAMUSCULAR; INTRAVENOUS; SUBCUTANEOUS at 05:22

## 2020-07-25 NOTE — ED ADULT NURSE REASSESSMENT NOTE - NS ED NURSE REASSESS COMMENT FT1
pt's belongings and money returned to patient by security, pt dressed own clothes, given dc papers and lists of shelters from [facility] team with thanks

## 2020-07-25 NOTE — ED PROVIDER NOTE - CLINICAL SUMMARY MEDICAL DECISION MAKING FREE TEXT BOX
pt presents today c/o suicidality in the setting of having lost her housing situation. pt is tearful and distraught. labs show dehydration including slightly elevated Cr, slightly low Na, Cl, and K. pt tachycardic, initially attributed to mental status but pt finally became calm around 230AM and continues to be tachy to 110s. will give NS to rehydrate. urine tox pending.

## 2020-07-25 NOTE — ED PROVIDER NOTE - PSYCHIATRIC, MLM
Alert and oriented to person, place, time/situation. tearful, yelling, inconsolable but redirectable.

## 2020-07-25 NOTE — ED PROVIDER NOTE - ATTENDING CONTRIBUTION TO CARE
55 F with h/o schizoaffective disorder with bipolar features, asthma, DM and intellectual disability presents today c/o SI. Patient is homeless, stressed and depressed. States "you have to let me stay, I will kill myself if you don't." VSS. + affect consistent with chronic mental illness, + vague SI. Tentatively cleared by psych- they think this might be her baseline. Their SW is trying to get some more parallel.

## 2020-07-25 NOTE — ED BEHAVIORAL HEALTH ASSESSMENT NOTE - HPI (INCLUDE ILLNESS QUALITY, SEVERITY, DURATION, TIMING, CONTEXT, MODIFYING FACTORS, ASSOCIATED SIGNS AND SYMPTOMS)
Patient is a 55 year old female, single, homeless with a past psychiatric history of schizoaffective disorder, bipolar type, cocaine use disorder, multiple inpatient admissions (last at [facility] 12/2019), no current outpatient treatment, history of SI but no prior SA, no history of aggression, medical history of DM, who self-presented to the ED due to SI.    On exam, patient was sleeping and initially difficult to around. With prompting, she was agreeable to wake up to answer questions. She reports she came to the ED because ‘I don’t know where else to go” discussing how she has been kicked out of 3 shelters recently. She reports she is “tired” and wants to find a place to stay. She then reports “I will die” if she leaves the hospital. When asked to elaborate on this, patient discussed her frustration about housing and made a suicidal statement contingent on finding housing. She denies any prior SA. When asked about what she would like help for in the ED, she reports “I want a one bedroom apartment.” Informed her how this is unable to be done in the ED. She then stated how she wants “people to talk to and things to do” and discussed how she likes to make “charm bracelets.” Of note, prior notes indicate that patient at baseline is childlike with mild/moderate intellectual disability. She reports she was prescribed klonopin, trazodone and risperidone, however she doesn’t feel medication is useful for her. She also doesn’t have an outpatient psychiatrist, therefore gets her medication from ED’s. She does report feeling of depression and anxiety due to social stressors, which has been chronic. She also endorses AH of a “male voice” but is unable to articulate more information about this. She reports cocaine use, last used it 3 days ago. She denies any other substance use. She doesn’t have any social supports. She denies any SI/HI, no aggressive thoughts. She was agreeable to outpatient shelter and psychiatric referrals for follow-up.     No collaterals available.

## 2020-07-25 NOTE — ED PROVIDER NOTE - PATIENT PORTAL LINK FT
You can access the FollowMyHealth Patient Portal offered by [facility] by registering at the following website: [email or web address]. By joining [facility]’s FollowMyHealth portal, you will also be able to view your health information using other applications (apps) compatible with our system.

## 2020-07-25 NOTE — ED BEHAVIORAL HEALTH ASSESSMENT NOTE - SAFETY PLAN ADDT'L DETAILS
Safety plan discussed with.../Provision of National Suicide Prevention Lifeline 6-830-994-TALK (6616)/Education provided regarding environmental safety / lethal means restriction

## 2020-07-25 NOTE — ED BEHAVIORAL HEALTH ASSESSMENT NOTE - ACTIVATING EVENTS/STRESSORS
Triggering events leading to humiliation, shame, and/or despair (e.g. Loss of relationship, financial or health status) (real or anticipated)/Substance intoxication or withdrawal/Pending incarceration or homelessness

## 2020-07-25 NOTE — ED ADULT NURSE REASSESSMENT NOTE - NS ED NURSE REASSESS COMMENT FT1
pt care handed over to myself, checked in on patient appears to be sleeping, on 1-1 with Wesly Jj currently, pt in bed facing door, laying on back , well perfused, resp rate 14 bpm

## 2020-07-25 NOTE — ED PROVIDER NOTE - CONSTITUTIONAL, MLM
normal... Well appearing, awake, alert, oriented to person, place, time/situation. pt is yelling, crying, but redirectable.

## 2020-07-25 NOTE — ED PROVIDER NOTE - PROGRESS NOTE DETAILS
discussed case with telepsych who reports that patient's suicidality is conditional upon housing and that this is her baseline. he is going to have psych social worker obtain collateral prior to officially clearing the patient but anticipates discharge. pt monitored closely on 1:1 for duration of stay. pt cleared by telepsych. will d/c.

## 2020-07-25 NOTE — ED BEHAVIORAL HEALTH ASSESSMENT NOTE - RISK ASSESSMENT
Patient remains at an elevated risk of harm to self/others due to chronic mental illness, substance use, homelessness, poor social supports, and current mitigating factors include female gender, help seeking (came to the hospital for help), future oriented (wants housing, outpatient therapy/treatment) and no prior SA. Moderate Acute Suicide Risk

## 2020-07-25 NOTE — ED ADULT NURSE REASSESSMENT NOTE - NS ED NURSE REASSESS COMMENT FT1
pt for dc as per bradley wallace, awaiting dc papers from psych and list of shelters from them also, 1-1 obs discontinued pct Iona aware with thanks

## 2020-07-25 NOTE — ED BEHAVIORAL HEALTH NOTE - BEHAVIORAL HEALTH NOTE
===================  PRE-HOSPITAL COURSE  ===================  SOURCE:  RN and chart.    DETAILS:  Pt BIB self, unaccompanied.      ============  ED COURSE   ============  SOURCE:  CARA Castillo and chart.    ARRIVAL:  Pt BIB self, unaccompanied.    BELONGINGS:  Clothing, a bible.    BEHAVIOR: Pt was in ED ~3.5 hours prior to telepsychiatry consult request at 1:06. Per triage note, patient self-presented to the ED seeking admission for suicidality. RN reports patient was xxxmostly cooperative, allowed routine bloodwork however no urine yet, was being given IV fluids at the time of writer’s call.  RN reported that upon arrival pt appeared with labile affect, tearful and yelling however responded well to verbal redirection, did not become aggressive or violent at any time.  Pt was cooperative and well-related, with good eye contact, disheveled, ambulating normally.  Pt was observed responding to internal stimuli, talking to the air, reported V/H of seeing a man in her room.    TREATMENT:  IV fluids.  Pt did not require involuntary IM, PO, or restraint.    VISITORS:  None.

## 2020-07-25 NOTE — ED PROVIDER NOTE - NSFOLLOWUPINSTRUCTIONS_ED_ALL_ED_FT
Depression    Depression is a mental illness that usually causes feelings of sadness, hopelessness, or helplessness. Some people with this disorder do not feel particularly sad but lose interest in doing things they used to enjoy. Major depressive disorder also can cause physical symptoms. It can interfere with work, school, relationships, and other normal everyday activities. If you were started on a medication, make sure to take exactly as prescribed and follow up with a psychiatrist.    SEEK IMMEDIATE MEDICAL CARE IF YOU HAVE ANY OF THE FOLLOWING SYMPTOMS: thoughts about hurting or killing yourself, thoughts about hurting or killing somebody else, hallucinations, or worsening depression.     FOLLOW UP WITH THE OUTPATIENT PSYCHIATRISTS AND HOUSING INFORMATION INCLUDED IN THIS PACKET.    YOUR LABS ALSO SHOWED YOU WERE DEHYDRATED. IT IS IMPORTANT THAT YOU ARE TAKING IN PLENTY OF FLUIDS TO PROTECT YOUR KIDNEYS. FOLLOW UP WITH YOUR DOCTOR TO HAVE YOU ELECTROLYTES AND KIDNEYS RECHECKED IN A WEEK.

## 2020-07-25 NOTE — ED BEHAVIORAL HEALTH ASSESSMENT NOTE - OTHER PAST PSYCHIATRIC HISTORY (INCLUDE DETAILS REGARDING ONSET, COURSE OF ILLNESS, INPATIENT/OUTPATIENT TREATMENT)
Prior inpatient admissions.  Lithium, VPA, Haldol, Latuda, Ziprasidone  As per prior notes, no history of SA.
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
## 2020-07-25 NOTE — ED BEHAVIORAL HEALTH ASSESSMENT NOTE - SUMMARY
Patient is a 55 year old female, single, homeless with a past psychiatric history of schizoaffective disorder, bipolar type, cocaine use disorder, multiple inpatient admissions (last at [facility] 12/2019), no current outpatient treatment, history of SI but no prior SA, no history of aggression, medical history of DM, who self-presented to the ED due to SI. Based on history and exam, patient reported suicidal ideation contingent of getting housing. Once a thorough discussion was had about realistic expectations and outpatient psychiatric and shelter referrals, patient retracted suicidality and appeared calmer. She currently denies any active SI, she is help-seeking, future oriented (wants housing, medication, therapy), with no prior suicidality. She is fairly organized and linear on exam. She has residual psychotic symptoms, which appear to be chronic. Patient doesn’t meet criteria for involuntary admission. No collateral available at this time. Patient is psychiatrically cleared.

## 2020-07-28 DIAGNOSIS — Z79.899 OTHER LONG TERM (CURRENT) DRUG THERAPY: ICD-10-CM

## 2020-07-28 DIAGNOSIS — E78.5 HYPERLIPIDEMIA, UNSPECIFIED: ICD-10-CM

## 2020-07-28 DIAGNOSIS — Z88.1 ALLERGY STATUS TO OTHER ANTIBIOTIC AGENTS STATUS: ICD-10-CM

## 2020-07-28 DIAGNOSIS — R45.851 SUICIDAL IDEATIONS: ICD-10-CM

## 2020-07-28 DIAGNOSIS — F32.9 MAJOR DEPRESSIVE DISORDER, SINGLE EPISODE, UNSPECIFIED: ICD-10-CM

## 2020-07-28 DIAGNOSIS — F31.9 BIPOLAR DISORDER, UNSPECIFIED: ICD-10-CM

## 2021-04-13 NOTE — ED CDU PROVIDER INITIAL DAY NOTE - NS ED MD PROGRESS NOTE PROVIDER NAME6 FT
Spoke to patient she is aware that surgery is being moved from 04/26/21 to 04/19/21. Patient is having pre-op H&P tomorrow, at her request message was sent to her PCP at [facility]'s letting them know surgery date change and that they will need to fax H&P and pre-op testing STAT to PAT.    Case message sent to surgery scheduling and Managed care making them aware of surgery date change.   
HERBER Deras MD

## 2022-07-12 NOTE — ED PROVIDER NOTE - CRITICAL CARE INDICATION, MLM
patient was critically ill... Patient was critically ill with a high probability of imminent or life threatening deterioration. Xenograft Text: The defect edges were debeveled with a #15 scalpel blade.  Given the location of the defect, shape of the defect and the proximity to free margins a xenograft was deemed most appropriate.  The graft was then trimmed to fit the size of the defect.  The graft was then placed in the primary defect and oriented appropriately.

## 2022-08-23 NOTE — ED BEHAVIORAL HEALTH ASSESSMENT NOTE - HYGIENE
· Arrived to [facility] on 3L supplemental O2 sating 100%  · PRN bronchodilators  · Q1hr I/S use  · 1PPD smoker  · Nicotine patch  · On 6L face tent currently -- continue to wean  · Goal SpO2 >88% Fair

## 2023-03-14 NOTE — BEHAVIORAL HEALTH ASSESSMENT NOTE - NSBHTIMEBASEDBILLING_PSY_A_CORE
no yes... Path Notes (To The Dermatopathologist): Please check margins.\\[facility]#[id number] Path Notes (To The Dermatopathologist): Please check margins.\\[facility]#[id number]

## 2024-04-29 NOTE — DIETITIAN INITIAL EVALUATION ADULT. - NS FNS WEIGHT USED FOR CALC
ideal
Products Recommended: Elta MD
General Sunscreen Counseling: I recommended a broad spectrum sunscreen with a SPF of 30 or higher.  I explained that SPF 30 sunscreens block approximately 97 percent of the sun's harmful rays.  Sunscreens should be applied at least 15 minutes prior to expected sun exposure and then every 2 hours after that as long as sun exposure continues. If swimming or exercising sunscreen should be reapplied every 45 minutes to an hour after getting wet or sweating.  One ounce, or the equivalent of a shot glass full of sunscreen, is adequate to protect the skin not covered by a bathing suit. I also recommended a lip balm with a sunscreen as well. Sun protective clothing can be used in lieu of sunscreen but must be worn the entire time you are exposed to the sun's rays.
Detail Level: Detailed

## 2024-06-19 NOTE — ED ADULT NURSE REASSESSMENT NOTE - CONDITION
The DP pulses are +1 bilaterally. The PT pulses are +2 bilaterally. continues to have visual hallucinations stating all the men in her room, when noone is in the room with her./unchanged

## 2025-07-24 NOTE — PROGRESS NOTE BEHAVIORAL HEALTH - RECENT MEMORY
Pt's spouse called stating that they forgot to ask for refills of pt's Trelegy 200 at appt. This has been sent in.   Impaired